# Patient Record
Sex: FEMALE | Race: WHITE | NOT HISPANIC OR LATINO | Employment: UNEMPLOYED | RURAL
[De-identification: names, ages, dates, MRNs, and addresses within clinical notes are randomized per-mention and may not be internally consistent; named-entity substitution may affect disease eponyms.]

---

## 2017-06-27 ENCOUNTER — HISTORICAL (OUTPATIENT)
Dept: ADMINISTRATIVE | Facility: HOSPITAL | Age: 47
End: 2017-06-27

## 2017-06-28 LAB
LAB AP CLINICAL INFORMATION: NORMAL
LAB AP COMMENTS: NORMAL
LAB AP DIAGNOSIS - HISTORICAL: NORMAL
LAB AP GROSS PATHOLOGY - HISTORICAL: NORMAL
LAB AP SPECIMEN SUBMITTED - HISTORICAL: NORMAL

## 2021-01-09 PROBLEM — E89.0 HYPOTHYROIDISM, POSTSURGICAL: Status: ACTIVE | Noted: 2018-07-03

## 2021-01-09 PROBLEM — C73 PAPILLARY MICROCARCINOMA OF THYROID: Status: ACTIVE | Noted: 2017-02-01

## 2021-01-09 PROBLEM — E78.5 DYSLIPIDEMIA (HIGH LDL; LOW HDL): Status: ACTIVE | Noted: 2018-06-27

## 2021-01-09 PROBLEM — R25.2 MUSCLE CRAMPS: Status: ACTIVE | Noted: 2018-07-05

## 2021-01-09 PROBLEM — G25.81 RESTLESS LEGS SYNDROME: Status: ACTIVE | Noted: 2020-02-17

## 2021-01-09 PROBLEM — L40.50 PSORIATIC ARTHRITIS: Status: ACTIVE | Noted: 2017-10-02

## 2021-01-09 PROBLEM — Z98.890 S/P COIL EMBOLIZATION OF CEREBRAL ANEURYSM: Status: ACTIVE | Noted: 2021-01-09

## 2021-03-03 ENCOUNTER — HISTORICAL (OUTPATIENT)
Dept: ADMINISTRATIVE | Facility: HOSPITAL | Age: 51
End: 2021-03-03

## 2021-03-04 LAB

## 2021-03-29 ENCOUNTER — OFFICE VISIT (OUTPATIENT)
Dept: OTOLARYNGOLOGY | Facility: CLINIC | Age: 51
End: 2021-03-29
Payer: MEDICARE

## 2021-03-29 VITALS — WEIGHT: 162 LBS | HEIGHT: 60 IN | BODY MASS INDEX: 31.8 KG/M2

## 2021-03-29 DIAGNOSIS — K11.20 SIALOADENITIS: Primary | ICD-10-CM

## 2021-03-29 PROCEDURE — 99204 PR OFFICE/OUTPT VISIT, NEW, LEVL IV, 45-59 MIN: ICD-10-PCS | Mod: S$PBB,,, | Performed by: OTOLARYNGOLOGY

## 2021-03-29 PROCEDURE — 99213 OFFICE O/P EST LOW 20 MIN: CPT | Mod: PBBFAC | Performed by: OTOLARYNGOLOGY

## 2021-03-29 PROCEDURE — 99999 PR PBB SHADOW E&M-EST. PATIENT-LVL III: ICD-10-PCS | Mod: PBBFAC,,, | Performed by: OTOLARYNGOLOGY

## 2021-03-29 PROCEDURE — 99204 OFFICE O/P NEW MOD 45 MIN: CPT | Mod: S$PBB,,, | Performed by: OTOLARYNGOLOGY

## 2021-03-29 PROCEDURE — 99999 PR PBB SHADOW E&M-EST. PATIENT-LVL III: CPT | Mod: PBBFAC,,, | Performed by: OTOLARYNGOLOGY

## 2021-03-29 RX ORDER — CIPROFLOXACIN 500 MG/1
500 TABLET ORAL DAILY
Qty: 28 TABLET | Refills: 0 | Status: SHIPPED | OUTPATIENT
Start: 2021-03-29 | End: 2022-01-10

## 2021-04-06 ENCOUNTER — HOSPITAL ENCOUNTER (OUTPATIENT)
Dept: RADIOLOGY | Facility: HOSPITAL | Age: 51
Discharge: HOME | End: 2021-04-06
Payer: MEDICARE

## 2021-04-06 VITALS — WEIGHT: 162 LBS | BODY MASS INDEX: 31.8 KG/M2 | HEIGHT: 60 IN

## 2021-04-06 DIAGNOSIS — Z12.31 VISIT FOR SCREENING MAMMOGRAM: ICD-10-CM

## 2021-04-06 PROCEDURE — 77067 SCR MAMMO BI INCL CAD: CPT | Mod: 26,,, | Performed by: RADIOLOGY

## 2021-04-06 PROCEDURE — 77067 SCR MAMMO BI INCL CAD: CPT | Mod: TC

## 2021-04-06 PROCEDURE — 77067 MAMMO DIGITAL SCREENING BILAT: ICD-10-PCS | Mod: 26,,, | Performed by: RADIOLOGY

## 2021-04-12 ENCOUNTER — OFFICE VISIT (OUTPATIENT)
Dept: OTOLARYNGOLOGY | Facility: CLINIC | Age: 51
End: 2021-04-12
Payer: MEDICARE

## 2021-04-12 VITALS — WEIGHT: 16 LBS | BODY MASS INDEX: 3.14 KG/M2 | HEIGHT: 60 IN

## 2021-04-12 DIAGNOSIS — K11.20 SIALOADENITIS: Primary | ICD-10-CM

## 2021-04-12 PROCEDURE — 99214 OFFICE O/P EST MOD 30 MIN: CPT | Mod: S$PBB,,, | Performed by: OTOLARYNGOLOGY

## 2021-04-12 PROCEDURE — 99214 PR OFFICE/OUTPT VISIT, EST, LEVL IV, 30-39 MIN: ICD-10-PCS | Mod: S$PBB,,, | Performed by: OTOLARYNGOLOGY

## 2021-04-12 PROCEDURE — 99999 PR PBB SHADOW E&M-EST. PATIENT-LVL III: ICD-10-PCS | Mod: PBBFAC,,, | Performed by: OTOLARYNGOLOGY

## 2021-04-12 PROCEDURE — 99213 OFFICE O/P EST LOW 20 MIN: CPT | Mod: PBBFAC | Performed by: OTOLARYNGOLOGY

## 2021-04-12 PROCEDURE — 99999 PR PBB SHADOW E&M-EST. PATIENT-LVL III: CPT | Mod: PBBFAC,,, | Performed by: OTOLARYNGOLOGY

## 2021-04-18 PROBLEM — Z79.899 HIGH RISK MEDICATION USE: Status: ACTIVE | Noted: 2021-03-19

## 2021-05-03 ENCOUNTER — OFFICE VISIT (OUTPATIENT)
Dept: OTOLARYNGOLOGY | Facility: CLINIC | Age: 51
End: 2021-05-03
Payer: MEDICARE

## 2021-05-03 VITALS — WEIGHT: 165 LBS | HEIGHT: 60 IN | BODY MASS INDEX: 32.39 KG/M2

## 2021-05-03 DIAGNOSIS — K11.20 SIALADENITIS: Primary | ICD-10-CM

## 2021-05-03 PROCEDURE — 99213 OFFICE O/P EST LOW 20 MIN: CPT | Mod: PBBFAC,25,, | Performed by: OTOLARYNGOLOGY

## 2021-05-03 PROCEDURE — 42330 REMOVAL OF SALIVARY STONE: CPT | Mod: S$PBB,,, | Performed by: OTOLARYNGOLOGY

## 2021-05-03 PROCEDURE — 42330 REMOVAL OF SALIVARY STONE: CPT | Mod: PBBFAC | Performed by: OTOLARYNGOLOGY

## 2021-05-03 PROCEDURE — 99213 OFFICE O/P EST LOW 20 MIN: CPT | Mod: S$PBB,25,, | Performed by: OTOLARYNGOLOGY

## 2021-05-03 PROCEDURE — 99213 HC OFFICE/OUTPT VISIT, EST, LEVL III, 20-29 MIN: ICD-10-PCS | Mod: PBBFAC,25,, | Performed by: OTOLARYNGOLOGY

## 2021-05-03 PROCEDURE — 99213 OFFICE O/P EST LOW 20 MIN: CPT | Mod: PBBFAC,25 | Performed by: OTOLARYNGOLOGY

## 2021-05-03 PROCEDURE — 42330 PR REMOVAL SALIVARY STONE,UNCOMPLIC: ICD-10-PCS | Mod: S$PBB,,, | Performed by: OTOLARYNGOLOGY

## 2022-06-08 ENCOUNTER — HOSPITAL ENCOUNTER (OUTPATIENT)
Dept: RADIOLOGY | Facility: HOSPITAL | Age: 52
Discharge: HOME | End: 2022-06-08
Payer: MEDICARE

## 2022-06-08 DIAGNOSIS — Z12.31 VISIT FOR SCREENING MAMMOGRAM: ICD-10-CM

## 2022-06-08 PROCEDURE — 77067 SCR MAMMO BI INCL CAD: CPT | Mod: TC

## 2022-06-08 PROCEDURE — 77067 SCR MAMMO BI INCL CAD: CPT | Mod: 26,,, | Performed by: RADIOLOGY

## 2022-06-08 PROCEDURE — 77067 MAMMO DIGITAL SCREENING BILAT: ICD-10-PCS | Mod: 26,,, | Performed by: RADIOLOGY

## 2023-01-03 PROBLEM — K75.81 NASH (NONALCOHOLIC STEATOHEPATITIS): Status: ACTIVE | Noted: 2018-07-05

## 2023-05-12 ENCOUNTER — OFFICE VISIT (OUTPATIENT)
Dept: GASTROENTEROLOGY | Facility: CLINIC | Age: 53
End: 2023-05-12
Payer: COMMERCIAL

## 2023-05-12 VITALS
BODY MASS INDEX: 34 KG/M2 | HEIGHT: 60 IN | SYSTOLIC BLOOD PRESSURE: 137 MMHG | DIASTOLIC BLOOD PRESSURE: 77 MMHG | WEIGHT: 173.19 LBS

## 2023-05-12 DIAGNOSIS — K75.81 NASH (NONALCOHOLIC STEATOHEPATITIS): Primary | ICD-10-CM

## 2023-05-12 DIAGNOSIS — Z87.19 HX OF DIVERTICULITIS OF COLON: ICD-10-CM

## 2023-05-12 DIAGNOSIS — K57.92 DIVERTICULITIS: ICD-10-CM

## 2023-05-12 DIAGNOSIS — R10.32 LEFT LOWER QUADRANT PAIN: ICD-10-CM

## 2023-05-12 PROCEDURE — 99214 PR OFFICE/OUTPT VISIT, EST, LEVL IV, 30-39 MIN: ICD-10-PCS | Mod: S$PBB,,, | Performed by: NURSE PRACTITIONER

## 2023-05-12 PROCEDURE — 99214 OFFICE O/P EST MOD 30 MIN: CPT | Mod: S$PBB,,, | Performed by: NURSE PRACTITIONER

## 2023-05-12 PROCEDURE — 99213 OFFICE O/P EST LOW 20 MIN: CPT | Mod: PBBFAC | Performed by: NURSE PRACTITIONER

## 2023-05-12 RX ORDER — AMOXICILLIN AND CLAVULANATE POTASSIUM 500; 125 MG/1; MG/1
1 TABLET, FILM COATED ORAL 2 TIMES DAILY
Qty: 20 TABLET | Refills: 0 | Status: SHIPPED | OUTPATIENT
Start: 2023-05-12 | End: 2023-05-22

## 2023-05-12 NOTE — PROGRESS NOTES
Andressa Handley is a 53 y.o. female here for No chief complaint on file.        PCP: Kristian Michael II  Referring Provider: Suellen Carrasco, Rosaliep  [address] [facility] [place] [address]     HPI:    Presents with report of left lower quadrant pain.  Patient reports that she has had diverticulitis approximately 4 times in the last 6 months.  Patient did bring a CT abdomen and pelvis report on her phone dated 04/10/2023, that was reviewed.  It did show acute diverticulitis in the sigmoid colon. Patient reports that she was treated at that time with Augmentin and Bactrim.  Patient states that she continues to have left lower quadrant abdominal pain. She is immunosuppressed and is taking Xeljanz for arthritis. No fever. States that she is unable to tolerate Cipro and Flagyl due to GI upset and nervousness.  Denies hematochezia or melena, nausea,or vomiting.States that she does have some bloating with a feeling of incomplete emptying.  Last colonoscopy 03/03/2021, tubular adenoma removed.         ROS:  Review of Systems   Constitutional:  Negative for appetite change, fatigue, fever and unexpected weight change.   HENT:  Negative for trouble swallowing.    Respiratory:  Negative for shortness of breath.    Cardiovascular:  Negative for chest pain.   Gastrointestinal:  Positive for abdominal pain and constipation. Negative for blood in stool, change in bowel habit, diarrhea, nausea, vomiting, reflux and change in bowel habit.   Musculoskeletal:  Positive for back pain. Negative for gait problem and joint swelling.   Integumentary:  Negative for pallor.   Allergic/Immunologic: Positive for immunocompromised state.   Hematological:  Does not bruise/bleed easily.   Psychiatric/Behavioral:  The patient is not nervous/anxious.         PMHX:  has a past medical history of Anemia, Arthritis, Autoimmune disease, Cancer, Cerebral aneurysm, Chest pain, Headache, High cholesterol, Kidney stones, Liver cyst,  "Muscle pain, and Neuropathy.    PSHX:  has a past surgical history that includes Tonsillectomy; Appendectomy; Cholecystectomy; Hysterectomy; Spine surgery; Cerebral aneurysm repair; Craniotomy; coiling of aneurysm; Cervical fusion; right shoulder repair; right knee repair; Thyroidectomy; internal hemorrhoid removal; oopherectomy; Bladder repair; and tvt repair.    PFHX: family history includes Dementia in her father; Lupus in her mother; Parkinsonism in her father.    PSlHX:  reports that she has quit smoking. She has never used smokeless tobacco. She reports that she does not drink alcohol and does not use drugs.        Review of patient's allergies indicates:   Allergen Reactions    Duloxetine Anaphylaxis and Other (See Comments)     Pt reports "night terrors",  "excessive sweating", fluid retention, and decreased urine output.      Modafinil Other (See Comments)     Suicidal thoughts      Fluocinolone acetonide Hives and Itching    Hydrocodone Other (See Comments)     Pt reports "locks my bowels".    Hydrocodone-acetaminophen     Morphine Nausea And Vomiting       Medication List with Changes/Refills   New Medications    AMOXICILLIN-CLAVULANATE 500-125MG (AUGMENTIN) 500-125 MG TAB    Take 1 tablet (500 mg total) by mouth 2 (two) times daily. for 10 days   Current Medications    ACETAMINOPHEN (TYLENOL EXTRA STRENGTH) 500 MG PWPK    1,000 mg 3 (three) times daily.    ALPHA LIPOIC ACID 200 MG CAP        DEXTROAMPHETAMINE (DEXTROSTAT) 10 MG TABLET    Take 10 mg by mouth 2 (two) times daily.    FOLIC ACID (FOLVITE) 1 MG TABLET    Take 1,000 mcg by mouth once daily.    LEVOTHYROXINE (SYNTHROID) 75 MCG TABLET    Take 1 tablet by mouth.    MELATONIN 10 MG CAP    Take by mouth.    METOPROLOL SUCCINATE (TOPROL-XL) 25 MG 24 HR TABLET    Take 25 mg by mouth once daily.    MULTIVITAMIN-IRON-FOLIC ACID TAB    Take 1 tablet by mouth once daily.    NALTREXONE HCL (NALTREXONE ORAL)    Take 7 mg by mouth.    NALTREXONE HCL, BULK, " 100 % POWD    Take 7 mg by mouth.    SELENIUM 200 MCG TAB    Take by mouth.    XELJANZ XR 11 MG TB24            Objective Findings:  Vital Signs:  /77   Ht 5' (1.524 m)   Wt 78.6 kg (173 lb 3.2 oz)   BMI 33.83 kg/m²  Body mass index is 33.83 kg/m².    Physical Exam:  Physical Exam  Vitals and nursing note reviewed.   Constitutional:       General: She is not in acute distress.     Appearance: Normal appearance.   HENT:      Mouth/Throat:      Mouth: Mucous membranes are moist.   Cardiovascular:      Rate and Rhythm: Normal rate.   Pulmonary:      Effort: Pulmonary effort is normal.      Breath sounds: No wheezing, rhonchi or rales.   Abdominal:      General: Bowel sounds are normal. There is no distension.      Palpations: Abdomen is soft. There is no mass.      Tenderness: There is abdominal tenderness in the left lower quadrant. There is no guarding.   Skin:     General: Skin is warm and dry.      Coloration: Skin is not jaundiced or pale.   Neurological:      Mental Status: She is alert and oriented to person, place, and time.   Psychiatric:         Mood and Affect: Mood normal.        Labs:  Lab Results   Component Value Date    WBC 6.39 05/12/2023    HGB 12.2 05/12/2023    HCT 39.2 05/12/2023    MCV 90.5 05/12/2023    RDW 13.1 05/12/2023     05/12/2023    LYMPH 17.5 (L) 05/12/2023    LYMPH 1.12 05/12/2023    MONO 10.3 (H) 05/12/2023    EOS 0.06 05/12/2023    BASO 0.05 05/12/2023     Lab Results   Component Value Date     05/12/2023    K 4.4 05/12/2023     (H) 05/12/2023    CO2 29 05/12/2023    GLU 94 05/12/2023    BUN 19 (H) 05/12/2023    CREATININE 0.79 05/12/2023    CALCIUM 8.9 05/12/2023    PROT 7.3 05/12/2023    ALBUMIN 3.9 05/12/2023    BILITOT 0.4 05/12/2023    ALKPHOS 93 05/12/2023    AST 28 05/12/2023    ALT 39 05/12/2023         Imaging: No results found.      Assessment:  Andressa Handley is a 53 y.o. female here with:  1. WILSON (nonalcoholic steatohepatitis)    2. Hx of  diverticulitis of colon    3. Left lower quadrant pain    4. Diverticulitis          Recommendations:  1.  Augmentin 500/125 twice daily x 10 days  2.   Schedule CT abdomen and pelvis  3. CBC and CMP today  4.  Go to ER for continued abdominal pain, fever, nausea, or vomiting  5.   Schedule colonoscopy in 6-8 weeks for recurrent diverticulitis    Follow up in about 4 weeks (around 6/9/2023).      Order summary:  Orders Placed This Encounter    CT Abdomen Pelvis With Contrast    Comprehensive Metabolic Panel    CBC Auto Differential    amoxicillin-clavulanate 500-125mg (AUGMENTIN) 500-125 mg Tab    Colonoscopy       Thank you for allowing me to participate in the care of Andressa Handley.      Suellen Carrasco, OBINNAC

## 2023-05-17 ENCOUNTER — HOSPITAL ENCOUNTER (OUTPATIENT)
Dept: RADIOLOGY | Facility: HOSPITAL | Age: 53
Discharge: HOME | End: 2023-05-17
Attending: NURSE PRACTITIONER
Payer: COMMERCIAL

## 2023-05-17 ENCOUNTER — PATIENT MESSAGE (OUTPATIENT)
Dept: GASTROENTEROLOGY | Facility: CLINIC | Age: 53
End: 2023-05-17
Payer: MEDICARE

## 2023-05-17 DIAGNOSIS — R10.32 LEFT LOWER QUADRANT PAIN: ICD-10-CM

## 2023-05-17 PROCEDURE — 74177 CT ABD & PELVIS W/CONTRAST: CPT | Mod: TC

## 2023-05-17 PROCEDURE — 25500020 PHARM REV CODE 255: Performed by: NURSE PRACTITIONER

## 2023-05-17 PROCEDURE — A9698 NON-RAD CONTRAST MATERIALNOC: HCPCS | Performed by: NURSE PRACTITIONER

## 2023-05-17 PROCEDURE — 74177 CT ABD & PELVIS W/CONTRAST: CPT | Mod: 26,,, | Performed by: RADIOLOGY

## 2023-05-17 PROCEDURE — 74177 CT ABDOMEN PELVIS WITH CONTRAST: ICD-10-PCS | Mod: 26,,, | Performed by: RADIOLOGY

## 2023-05-17 RX ADMIN — IOPAMIDOL 100 ML: 755 INJECTION, SOLUTION INTRAVENOUS at 10:05

## 2023-05-17 RX ADMIN — BARIUM SULFATE 450 ML: 20 SUSPENSION ORAL at 11:05

## 2023-06-08 ENCOUNTER — OFFICE VISIT (OUTPATIENT)
Dept: GASTROENTEROLOGY | Facility: CLINIC | Age: 53
End: 2023-06-08
Payer: COMMERCIAL

## 2023-06-08 VITALS
HEIGHT: 61 IN | DIASTOLIC BLOOD PRESSURE: 70 MMHG | BODY MASS INDEX: 32.63 KG/M2 | WEIGHT: 172.81 LBS | SYSTOLIC BLOOD PRESSURE: 122 MMHG

## 2023-06-08 DIAGNOSIS — K75.81 NASH (NONALCOHOLIC STEATOHEPATITIS): ICD-10-CM

## 2023-06-08 DIAGNOSIS — K57.92 DIVERTICULITIS: Primary | ICD-10-CM

## 2023-06-08 PROCEDURE — 1159F PR MEDICATION LIST DOCUMENTED IN MEDICAL RECORD: ICD-10-PCS | Mod: CPTII,,, | Performed by: NURSE PRACTITIONER

## 2023-06-08 PROCEDURE — 3008F BODY MASS INDEX DOCD: CPT | Mod: CPTII,,, | Performed by: NURSE PRACTITIONER

## 2023-06-08 PROCEDURE — 99213 OFFICE O/P EST LOW 20 MIN: CPT | Mod: PBBFAC | Performed by: NURSE PRACTITIONER

## 2023-06-08 PROCEDURE — 3074F SYST BP LT 130 MM HG: CPT | Mod: CPTII,,, | Performed by: NURSE PRACTITIONER

## 2023-06-08 PROCEDURE — 3074F PR MOST RECENT SYSTOLIC BLOOD PRESSURE < 130 MM HG: ICD-10-PCS | Mod: CPTII,,, | Performed by: NURSE PRACTITIONER

## 2023-06-08 PROCEDURE — 3008F PR BODY MASS INDEX (BMI) DOCUMENTED: ICD-10-PCS | Mod: CPTII,,, | Performed by: NURSE PRACTITIONER

## 2023-06-08 PROCEDURE — 3078F DIAST BP <80 MM HG: CPT | Mod: CPTII,,, | Performed by: NURSE PRACTITIONER

## 2023-06-08 PROCEDURE — 1159F MED LIST DOCD IN RCRD: CPT | Mod: CPTII,,, | Performed by: NURSE PRACTITIONER

## 2023-06-08 PROCEDURE — 99214 PR OFFICE/OUTPT VISIT, EST, LEVL IV, 30-39 MIN: ICD-10-PCS | Mod: S$PBB,,, | Performed by: NURSE PRACTITIONER

## 2023-06-08 PROCEDURE — 99214 OFFICE O/P EST MOD 30 MIN: CPT | Mod: S$PBB,,, | Performed by: NURSE PRACTITIONER

## 2023-06-08 PROCEDURE — 3078F PR MOST RECENT DIASTOLIC BLOOD PRESSURE < 80 MM HG: ICD-10-PCS | Mod: CPTII,,, | Performed by: NURSE PRACTITIONER

## 2023-06-08 NOTE — PROGRESS NOTES
Andressa Handley is a 53 y.o. female here for No chief complaint on file.        PCP: Kristian Michael II  Referring Provider: No referring provider defined for this encounter.     HPI:  Presents for follow-up due to diverticulitis.  Patient has had recurrent diverticulitis x 4 in the last 6 months.  CT abdomen and pelvis was performed on 05/17 that did show mild diverticulitis.  The patient was treated with Augmentin.  She reports that she has had some improvement in the abdominal pain.  States that she does have some tenderness with bowel movements.  She is scheduled for colonoscopy in July due to recurrent diverticulitis.  She is also immune suppressed and is currently taking Xeljanz.  We did discuss that for continued recurrent diverticulitis with referral to surgeon may be necessary.  No hematochezia or melena.  Denies any constipation.  Last colonoscopy was 03/03/2021, tubular adenoma removed.  No nausea or vomiting.  Afebrile.  We did discuss that for acute abdominal pain that is accompanied by fever, nausea, or vomiting.  That she may have to go to the ER for evaluation.  Otherwise she will contact our office if repeat antibiotics are needed.        ROS:  Review of Systems   Constitutional:  Positive for appetite change. Negative for fatigue, fever and unexpected weight change.   HENT:  Negative for trouble swallowing.    Respiratory:  Negative for shortness of breath.    Cardiovascular:  Negative for chest pain.   Gastrointestinal:  Positive for abdominal pain (with bowel movement). Negative for blood in stool, change in bowel habit, constipation, diarrhea, nausea, vomiting, reflux and change in bowel habit.   Musculoskeletal:  Positive for arthralgias. Negative for gait problem.   Integumentary:  Negative for pallor.   Allergic/Immunologic: Positive for immunocompromised state (on Xeljanz).   Neurological:  Negative for dizziness and light-headedness.   Hematological:  Does not bruise/bleed easily.  "  Psychiatric/Behavioral:  The patient is not nervous/anxious.         PMHX:  has a past medical history of Anemia, Arthritis, Autoimmune disease, Cancer, Cerebral aneurysm, Chest pain, Headache, High cholesterol, Kidney stones, Liver cyst, Muscle pain, and Neuropathy.    PSHX:  has a past surgical history that includes Tonsillectomy; Appendectomy; Cholecystectomy; Hysterectomy; Spine surgery; Cerebral aneurysm repair; Craniotomy; coiling of aneurysm; Cervical fusion; right shoulder repair; right knee repair; Thyroidectomy; internal hemorrhoid removal; oopherectomy; Bladder repair; and tvt repair.    PFHX: family history includes Dementia in her father; Lupus in her mother; Parkinsonism in her father.    PSlHX:  reports that she has quit smoking. She has never used smokeless tobacco. She reports that she does not drink alcohol and does not use drugs.        Review of patient's allergies indicates:   Allergen Reactions    Duloxetine Anaphylaxis and Other (See Comments)     Pt reports "night terrors",  "excessive sweating", fluid retention, and decreased urine output.      Modafinil Other (See Comments)     Suicidal thoughts      Fluocinolone acetonide Hives and Itching    Hydrocodone Other (See Comments)     Pt reports "locks my bowels".    Hydrocodone-acetaminophen     Morphine Nausea And Vomiting       Medication List with Changes/Refills   Current Medications    ACETAMINOPHEN (TYLENOL EXTRA STRENGTH) 500 MG PWPK    1,000 mg 3 (three) times daily.    ALPHA LIPOIC ACID 200 MG CAP        DEXTROAMPHETAMINE (DEXTROSTAT) 10 MG TABLET    Take 10 mg by mouth 2 (two) times daily.    FOLIC ACID (FOLVITE) 1 MG TABLET    Take 1,000 mcg by mouth once daily.    LEVOTHYROXINE (SYNTHROID) 75 MCG TABLET    Take 1 tablet by mouth.    MELATONIN 10 MG CAP    Take by mouth.    METOPROLOL SUCCINATE (TOPROL-XL) 25 MG 24 HR TABLET    Take 25 mg by mouth once daily.    MULTIVITAMIN-IRON-FOLIC ACID TAB    Take 1 tablet by mouth once daily. " "   NALTREXONE HCL (NALTREXONE ORAL)    Take 7 mg by mouth.    NALTREXONE HCL, BULK, 100 % POWD    Take 7 mg by mouth.    SELENIUM 200 MCG TAB    Take by mouth.    XELJANZ XR 11 MG TB24            Objective Findings:  Vital Signs:  /70   Ht 5' 1" (1.549 m)   Wt 78.4 kg (172 lb 12.8 oz)   BMI 32.65 kg/m²  Body mass index is 32.65 kg/m².    Physical Exam:  Physical Exam  Vitals and nursing note reviewed.   Constitutional:       General: She is not in acute distress.     Appearance: Normal appearance.   HENT:      Mouth/Throat:      Mouth: Mucous membranes are moist.   Cardiovascular:      Rate and Rhythm: Normal rate.   Pulmonary:      Effort: Pulmonary effort is normal.      Breath sounds: No wheezing, rhonchi or rales.   Abdominal:      General: Bowel sounds are normal. There is no distension.      Palpations: Abdomen is soft. There is no mass.      Tenderness: There is no abdominal tenderness. There is no guarding.      Comments: Diastasis Recti   Skin:     General: Skin is warm and dry.      Coloration: Skin is not jaundiced or pale.   Neurological:      Mental Status: She is alert and oriented to person, place, and time.   Psychiatric:         Mood and Affect: Mood normal.        Labs:  Lab Results   Component Value Date    WBC 6.39 05/12/2023    HGB 12.2 05/12/2023    HCT 39.2 05/12/2023    MCV 90.5 05/12/2023    RDW 13.1 05/12/2023     05/12/2023    LYMPH 17.5 (L) 05/12/2023    LYMPH 1.12 05/12/2023    MONO 10.3 (H) 05/12/2023    EOS 0.06 05/12/2023    BASO 0.05 05/12/2023     Lab Results   Component Value Date     05/12/2023    K 4.4 05/12/2023     (H) 05/12/2023    CO2 29 05/12/2023    GLU 94 05/12/2023    BUN 19 (H) 05/12/2023    CREATININE 0.79 05/12/2023    CALCIUM 8.9 05/12/2023    PROT 7.3 05/12/2023    ALBUMIN 3.9 05/12/2023    BILITOT 0.4 05/12/2023    ALKPHOS 93 05/12/2023    AST 28 05/12/2023    ALT 39 05/12/2023         Imaging: CT Abdomen Pelvis With Contrast    Result " Date: 5/17/2023  EXAMINATION: CT ABDOMEN PELVIS WITH CONTRAST CLINICAL HISTORY: LLQ abdominal pain;diverticulitis; Left lower quadrant pain TECHNIQUE: Axial CT imaging of the abdomen and pelvis is performed with intravenous and oral contrast.  Contrast dose is 100 cc Isovue 370. CT dose reduction technique used - Dose Rite and tube current modulation. COMPARISON: Twenty-one October 2015 FINDINGS: Cardiac and lung bases are within normal limits CT abdomen: The gallbladder is been removed.  There is diffusely decreased in density.  There is a small nonenhancing cyst unchanged from previous.  Duodenal diverticulum in the head of the pancreas similar to previous.  Otherwise the spleen, pancreas and adrenal glands are normal in size and enhancement.  No evidence of focal lesion is demonstrated in these solid organs. Nonenhancing calculus right kidney measuring 7.7 mm.  Otherwise kidneys are normal in size and enhancement.  No evidence of hydronephrosis or ureterolithiasis is seen. Multiple diverticula present in the colon most prominent in the descending and sigmoid colon.  Trace amount of stranding adjacent to the descending sigmoid colon junction.  The bowel caliber is normal and no wall thickening or adjacent inflammatory change is seen.  No evidence of free fluid or free air is present.  Appendix has been removed. CT pelvis: The pelvic bowel appears within normal limits.  Bladder shows no evidence of abnormality.  The uterus and ovaries are not identified.     Findings suggest mild diverticulitis.  No other bony abnormality. Electronically signed by: Hernandez Tirado Date:    05/17/2023 Time:    10:12        Assessment:  Andressa aHndley is a 53 y.o. female here with:  1. Diverticulitis    2. WILSON (nonalcoholic steatohepatitis)          Recommendations:  1. We did discuss that for acute abdominal pain that is accompanied by fever, nausea, or vomiting.  That she may have to go to the ER for evaluation.  Otherwise she  will contact our office if repeat antibiotics are needed.  2. Keep appointment for colonoscopy in July  3. Consider referral to surgeon for recurrent diverticulitis  4. Anticipate liver ultrasound in 6 months due to history of WILSON  5. Exercise 150 minutes per week  Weight loss of 7-10%. Weight loss should be gradual  Diet low in saturated fats and carbohydrates  Good glucose and cholesterol control      Follow up in about 3 months (around 9/8/2023).      Order summary:       Thank you for allowing me to participate in the care of Andressa Handley.      DEONTE Pena

## 2023-06-12 ENCOUNTER — PATIENT MESSAGE (OUTPATIENT)
Dept: GASTROENTEROLOGY | Facility: CLINIC | Age: 53
End: 2023-06-12
Payer: MEDICARE

## 2023-06-12 DIAGNOSIS — K57.92 DIVERTICULITIS: Primary | ICD-10-CM

## 2023-06-12 RX ORDER — AMOXICILLIN AND CLAVULANATE POTASSIUM 875; 125 MG/1; MG/1
1 TABLET, FILM COATED ORAL EVERY 12 HOURS
Qty: 20 TABLET | Refills: 0 | Status: SHIPPED | OUTPATIENT
Start: 2023-06-12 | End: 2023-06-22

## 2023-06-13 DIAGNOSIS — K57.92 DIVERTICULITIS: Primary | ICD-10-CM

## 2023-07-05 ENCOUNTER — PATIENT MESSAGE (OUTPATIENT)
Dept: GASTROENTEROLOGY | Facility: CLINIC | Age: 53
End: 2023-07-05
Payer: MEDICARE

## 2023-07-06 DIAGNOSIS — K57.92 DIVERTICULITIS: Primary | ICD-10-CM

## 2023-07-06 RX ORDER — AMOXICILLIN AND CLAVULANATE POTASSIUM 875; 125 MG/1; MG/1
1 TABLET, FILM COATED ORAL EVERY 12 HOURS
Qty: 20 TABLET | Refills: 0 | Status: SHIPPED | OUTPATIENT
Start: 2023-07-06 | End: 2023-07-16

## 2023-07-06 NOTE — TELEPHONE ENCOUNTER
Please push back patients colonoscopy for another 6 weeks and call her with new appointment. Thank you.

## 2023-08-31 ENCOUNTER — HOSPITAL ENCOUNTER (OUTPATIENT)
Dept: GASTROENTEROLOGY | Facility: HOSPITAL | Age: 53
Discharge: HOME | End: 2023-08-31
Attending: NURSE PRACTITIONER
Payer: MEDICARE

## 2023-08-31 ENCOUNTER — ANESTHESIA EVENT (OUTPATIENT)
Dept: GASTROENTEROLOGY | Facility: HOSPITAL | Age: 53
End: 2023-08-31
Payer: MEDICARE

## 2023-08-31 ENCOUNTER — ANESTHESIA (OUTPATIENT)
Dept: GASTROENTEROLOGY | Facility: HOSPITAL | Age: 53
End: 2023-08-31
Payer: MEDICARE

## 2023-08-31 VITALS
SYSTOLIC BLOOD PRESSURE: 100 MMHG | RESPIRATION RATE: 16 BRPM | HEART RATE: 77 BPM | OXYGEN SATURATION: 99 % | WEIGHT: 170 LBS | TEMPERATURE: 98 F | BODY MASS INDEX: 32.12 KG/M2 | DIASTOLIC BLOOD PRESSURE: 53 MMHG

## 2023-08-31 DIAGNOSIS — Z86.010 HISTORY OF COLON POLYPS: ICD-10-CM

## 2023-08-31 DIAGNOSIS — Z87.19 HISTORY OF DIVERTICULITIS: ICD-10-CM

## 2023-08-31 DIAGNOSIS — K57.92 DIVERTICULITIS: ICD-10-CM

## 2023-08-31 DIAGNOSIS — K57.30 DIVERTICULOSIS OF COLON: ICD-10-CM

## 2023-08-31 DIAGNOSIS — Z12.11 SCREENING FOR COLON CANCER: Primary | ICD-10-CM

## 2023-08-31 PROBLEM — Z86.0100 HISTORY OF COLON POLYPS: Status: ACTIVE | Noted: 2023-08-31

## 2023-08-31 PROCEDURE — 25000003 PHARM REV CODE 250: Performed by: NURSE ANESTHETIST, CERTIFIED REGISTERED

## 2023-08-31 PROCEDURE — D9220A PRA ANESTHESIA: ICD-10-PCS | Mod: ,,, | Performed by: NURSE ANESTHETIST, CERTIFIED REGISTERED

## 2023-08-31 PROCEDURE — D9220A PRA ANESTHESIA: Mod: ,,, | Performed by: NURSE ANESTHETIST, CERTIFIED REGISTERED

## 2023-08-31 PROCEDURE — 63600175 PHARM REV CODE 636 W HCPCS: Performed by: NURSE ANESTHETIST, CERTIFIED REGISTERED

## 2023-08-31 PROCEDURE — G0105 COLORECTAL SCRN; HI RISK IND: HCPCS | Performed by: INTERNAL MEDICINE

## 2023-08-31 PROCEDURE — 37000008 HC ANESTHESIA 1ST 15 MINUTES

## 2023-08-31 PROCEDURE — 27000284 HC CANNULA NASAL: Performed by: NURSE ANESTHETIST, CERTIFIED REGISTERED

## 2023-08-31 PROCEDURE — G0105 COLORECTAL SCRN; HI RISK IND: HCPCS | Mod: ,,, | Performed by: INTERNAL MEDICINE

## 2023-08-31 PROCEDURE — G0105 COLORECTAL SCRN; HI RISK IND: ICD-10-PCS | Mod: ,,, | Performed by: INTERNAL MEDICINE

## 2023-08-31 PROCEDURE — 27000716 HC OXISENSOR PROBE, ANY SIZE: Performed by: NURSE ANESTHETIST, CERTIFIED REGISTERED

## 2023-08-31 RX ORDER — LIDOCAINE HYDROCHLORIDE 20 MG/ML
INJECTION, SOLUTION EPIDURAL; INFILTRATION; INTRACAUDAL; PERINEURAL
Status: DISCONTINUED | OUTPATIENT
Start: 2023-08-31 | End: 2023-08-31

## 2023-08-31 RX ORDER — SODIUM CHLORIDE 0.9 % (FLUSH) 0.9 %
10 SYRINGE (ML) INJECTION
Status: CANCELLED | OUTPATIENT
Start: 2023-08-31

## 2023-08-31 RX ORDER — PROPOFOL 10 MG/ML
VIAL (ML) INTRAVENOUS
Status: DISCONTINUED | OUTPATIENT
Start: 2023-08-31 | End: 2023-08-31

## 2023-08-31 RX ORDER — PHENYLEPHRINE HYDROCHLORIDE 10 MG/ML
INJECTION INTRAVENOUS
Status: DISCONTINUED | OUTPATIENT
Start: 2023-08-31 | End: 2023-08-31

## 2023-08-31 RX ADMIN — PROPOFOL 100 MG: 10 INJECTION, EMULSION INTRAVENOUS at 09:08

## 2023-08-31 RX ADMIN — LIDOCAINE HYDROCHLORIDE 100 MG: 20 INJECTION, SOLUTION INTRAVENOUS at 09:08

## 2023-08-31 RX ADMIN — PHENYLEPHRINE HYDROCHLORIDE 100 MCG: 10 INJECTION INTRAVENOUS at 09:08

## 2023-08-31 RX ADMIN — SODIUM CHLORIDE: 9 INJECTION, SOLUTION INTRAVENOUS at 09:08

## 2023-08-31 NOTE — H&P
"[facility]  Gastroenterology  H&P    Patient Name: Andressa Handley  MRN: [id number]  Admission Date: 8/31/2023  Code Status: No Order    Attending Provider: Suellen Carrasco FNP   Primary Care Physician: Kristian Michael II, MD  Principal Problem:<principal problem not specified>    Subjective:     History of Present Illness: Pt has history of colon polyps and recurrent diverticulitis. Her last colonoscopy was 2021.    Past Medical History:   Diagnosis Date    Anemia     Arthritis     Autoimmune disease     Cancer     thyroid    Cerebral aneurysm     Chest pain     Headache     High cholesterol     Hypotension, iatrogenic     Kidney stones     Liver cyst     Muscle pain     Neuropathy        Past Surgical History:   Procedure Laterality Date    APPENDECTOMY      BLADDER REPAIR      CEREBRAL ANEURYSM REPAIR      CERVICAL FUSION      CHOLECYSTECTOMY      coiling of aneurysm      CRANIOTOMY      HYSTERECTOMY      internal hemorrhoid removal      oopherectomy      right knee repair      right shoulder repair      SPINE SURGERY      Laminectomy and Axial Fusion at L5-S1    THYROIDECTOMY      TONSILLECTOMY      tvt repair         Review of patient's allergies indicates:   Allergen Reactions    Duloxetine Anaphylaxis and Other (See Comments)     Pt reports "night terrors",  "excessive sweating", fluid retention, and decreased urine output.      Modafinil Other (See Comments)     Suicidal thoughts      Fluocinolone acetonide Hives and Itching    Hydrocodone Other (See Comments)     Pt reports "locks my bowels".    Hydrocodone-acetaminophen     Morphine Nausea And Vomiting     Family History       Problem Relation (Age of Onset)    Dementia Father    Lupus Mother    Parkinsonism Father          Tobacco Use    Smoking status: Former    Smokeless tobacco: Never   Substance and Sexual Activity    Alcohol use: Never    Drug use: Never    Sexual activity: Not on file     Review of Systems   Respiratory: Negative.   " "  Cardiovascular: Negative.    Gastrointestinal: Negative.      Objective:     Vital Signs (Most Recent):  Pulse: 84 (08/31/23 0808)  Resp: 16 (08/31/23 0808)  BP: 113/69 (08/31/23 0808)  SpO2: 98 % (08/31/23 0808) Vital Signs (24h Range):  Pulse:  [84] 84  Resp:  [16] 16  SpO2:  [98 %] 98 %  BP: (113)/(69) 113/69     Weight: 77.1 kg (170 lb) (08/31/23 0808)  Body mass index is 32.12 kg/m².    No intake or output data in the 24 hours ending 08/31/23 0923    Lines/Drains/Airways       Peripheral Intravenous Line  Duration                  Peripheral IV - Single Lumen 08/31/23 0809 22 G Left;Posterior Hand <1 day                    Physical Exam  Vitals reviewed.   Constitutional:       General: She is not in acute distress.     Appearance: Normal appearance. She is well-developed. She is not ill-appearing.   HENT:      Head: Normocephalic and atraumatic.      Nose: Nose normal.   Eyes:      Pupils: Pupils are equal, round, and reactive to light.   Cardiovascular:      Rate and Rhythm: Normal rate and regular rhythm.   Pulmonary:      Effort: Pulmonary effort is normal.      Breath sounds: Normal breath sounds. No wheezing.   Abdominal:      General: Abdomen is flat. Bowel sounds are normal. There is no distension.      Palpations: Abdomen is soft.      Tenderness: There is no abdominal tenderness. There is no guarding.   Skin:     General: Skin is warm and dry.      Coloration: Skin is not jaundiced.   Neurological:      Mental Status: She is alert.   Psychiatric:         Attention and Perception: Attention normal.         Mood and Affect: Affect normal.         Speech: Speech normal.         Behavior: Behavior is cooperative.      Comments: Pt was calm while speaking.         Significant Labs:  CBC: No results for input(s): "WBC", "HGB", "HCT", "PLT" in the last 48 hours.  CMP: No results for input(s): "GLU", "CALCIUM", "ALBUMIN", "PROT", "NA", "K", "CO2", "CL", "BUN", "CREATININE", "ALKPHOS", "ALT", "AST", " ""BILITOT" in the last 48 hours.    Significant Imaging:  Imaging results within the past 24 hours have been reviewed.    Assessment/Plan:     There are no hospital problems to display for this patient.        Imp: recurrent diverticulitis; history of colon polyps  Plan: colonoscopy    Tom Sheppard MD  Gastroenterology  [facility]  "

## 2023-08-31 NOTE — TRANSFER OF CARE
Anesthesia Transfer of Care Note    Patient: Andressa Handley    Procedure(s) Performed: * No procedures listed *    Patient location: GI    Anesthesia Type: general    Transport from OR: Transported from OR on room air with adequate spontaneous ventilation. Continuous ECG monitoring in transport. Continuous SpO2 monitoring in transport    Post pain: adequate analgesia    Post assessment: no apparent anesthetic complications    Post vital signs: stable    Level of consciousness: sedated and responds to stimulation    Nausea/Vomiting: no nausea/vomiting    Complications: none    Transfer of care protocol was followedComments: Good SV continue, NAD, VSS, RTRN      Last vitals:   Visit Vitals  BP (!) 100/53 (BP Location: Right arm, Patient Position: Lying)   Pulse 77   Temp 36.4 °C (97.5 °F) (Oral)   Resp 16   Wt 77.1 kg (170 lb)   SpO2 99%   Breastfeeding No   BMI 32.12 kg/m²

## 2023-08-31 NOTE — DISCHARGE INSTRUCTIONS
Do not drive or operate machinery today.  Procedure Date  8/31/23     Impression  Overall Impression:   Diverticulosis in the descending colon and sigmoid colon  No colon polyps were seen. No active diverticulitis was seen.     Recommendation    Repeat colonoscopy in 5 years      High fiber diet  Discharge:          disp : DC to home. Resume diet. No driving x 24 hours. F/U with PCP as scheduled.  Dx: Colon diverticulosis, history of colon polyps; history of diverticulitis.

## 2023-08-31 NOTE — ANESTHESIA PREPROCEDURE EVALUATION
08/31/2023  Andressa Handley is a 53 y.o., female.      Pre-op Assessment    I have reviewed the Patient Summary Reports.     I have reviewed the Nursing Notes. I have reviewed the NPO Status.   I have reviewed the Medications.     Review of Systems  Anesthesia Hx:  No problems with previous Anesthesia    Social:  No Alcohol Use, Non-Smoker    Cardiovascular:   Hypertension, well controlled Valvular problems/Murmurs, MR hyperlipidemia    Pulmonary:   Sleep Apnea, CPAP    Renal/:   Chronic Renal Disease, CKD    Hepatic/GI:   Liver Disease, Hepatitis Nonalcoholic steatohepatitis   Musculoskeletal:   Arthritis  Cervical Spondylosis  Lumbar spondylosis   Neurological:   Headaches Non ruptured cerebral aneursym    Endocrine:   Hypothyroidism  Obesity / BMI > 30      Physical Exam  General: Well nourished, Cooperative, Alert and Oriented    Airway:  Mallampati: II   Mouth Opening: Normal  TM Distance: Normal  Tongue: Normal  Neck ROM: Normal ROM    Dental:  Intact        Anesthesia Plan  Type of Anesthesia, risks & benefits discussed:    Anesthesia Type: Gen Natural Airway, MAC  Intra-op Monitoring Plan: Standard ASA Monitors  Post Op Pain Control Plan: multimodal analgesia and IV/PO Opioids PRN  Induction:  IV  Informed Consent: Informed consent signed with the Patient and all parties understand the risks and agree with anesthesia plan.  All questions answered. Patient consented to blood products? Yes  ASA Score: 3  Day of Surgery Review of History & Physical: I have interviewed and examined the patient. I have reviewed the patient's H&P dated: There are no significant changes.     Ready For Surgery From Anesthesia Perspective.     Past Medical History:   Diagnosis Date    Anemia     Arthritis     Autoimmune disease     Cancer     thyroid    Cerebral aneurysm     Chest pain     Headache     High  "cholesterol     Hypotension, iatrogenic     Kidney stones     Liver cyst     Muscle pain     Neuropathy        Past Surgical History:   Procedure Laterality Date    APPENDECTOMY      BLADDER REPAIR      CEREBRAL ANEURYSM REPAIR      CERVICAL FUSION      CHOLECYSTECTOMY      coiling of aneurysm      CRANIOTOMY      HYSTERECTOMY      internal hemorrhoid removal      oopherectomy      right knee repair      right shoulder repair      SPINE SURGERY      Laminectomy and Axial Fusion at L5-S1    THYROIDECTOMY      TONSILLECTOMY      tvt repair         Family History   Problem Relation Age of Onset    Lupus Mother     Parkinsonism Father     Dementia Father        Social History     Socioeconomic History    Marital status:    Tobacco Use    Smoking status: Former    Smokeless tobacco: Never   Substance and Sexual Activity    Alcohol use: Never    Drug use: Never       Current Outpatient Medications   Medication Sig Dispense Refill    acetaminophen (TYLENOL EXTRA STRENGTH) 500 mg PwPk 1,000 mg 3 (three) times daily.      alpha lipoic acid 200 mg Cap       dextroamphetamine (DEXTROSTAT) 10 MG tablet Take 10 mg by mouth 2 (two) times daily.      folic acid (FOLVITE) 1 MG tablet Take 1,000 mcg by mouth once daily.      levothyroxine (SYNTHROID) 75 MCG tablet Take 1 tablet by mouth.      melatonin 10 mg Cap Take by mouth.      metoprolol succinate (TOPROL-XL) 25 MG 24 hr tablet Take 25 mg by mouth once daily.      multivitamin-iron-folic acid Tab Take 1 tablet by mouth once daily.      naltrexone HCl (NALTREXONE ORAL) Take 7 mg by mouth.      naltrexone HCl, bulk, 100 % Powd Take 7 mg by mouth.      selenium 200 mcg Tab Take by mouth.      XELJANZ XR 11 mg Tb24        No current facility-administered medications for this encounter.       Review of patient's allergies indicates:   Allergen Reactions    Duloxetine Anaphylaxis and Other (See Comments)     Pt reports "night terrors", " " "excessive sweating", fluid retention, and decreased urine output.      Modafinil Other (See Comments)     Suicidal thoughts      Fluocinolone acetonide Hives and Itching    Hydrocodone Other (See Comments)     Pt reports "locks my bowels".    Hydrocodone-acetaminophen     Morphine Nausea And Vomiting     .  Patient Active Problem List   Diagnosis    Arthritis    Cerebral aneurysm, nonruptured    Cervical spondylosis    Lumbar spondylosis    Dyslipidemia (high LDL; low HDL)    Hypothyroidism, postsurgical    Muscle cramps    Mitral regurgitation    Obesity (BMI 30-39.9)    Obstructive sleep apnea    Primary narcolepsy without cataplexy    Papillary microcarcinoma of thyroid    Psoriatic arthritis    Restless legs syndrome    S/P coil embolization of cerebral aneurysm    High risk medication use    WILSON (nonalcoholic steatohepatitis)    Palpitations    Essential hypertension    Hx of diverticulitis of colon    Left lower quadrant pain         "

## 2023-08-31 NOTE — ANESTHESIA POSTPROCEDURE EVALUATION
Anesthesia Post Evaluation    Patient: Andressa Handley    Procedure(s) Performed: Colonoscopy    Final Anesthesia Type: general      Patient location during evaluation: GI PACU  Patient participation: Yes- Able to Participate  Level of consciousness: awake and alert  Post-procedure vital signs: reviewed and stable  Pain management: adequate  Airway patency: patent    PONV status at discharge: No PONV  Anesthetic complications: no      Cardiovascular status: blood pressure returned to baseline and hemodynamically stable  Respiratory status: spontaneous ventilation  Hydration status: euvolemic  Follow-up not needed.  Comments: Refer to nursing notes for pain/scott score upon discharge from recovery.          Vitals Value Taken Time   /48 08/31/23 1016   Temp 97.5 08/31/23 1023   Pulse 67 08/31/23 1017   Resp 11 08/31/23 1017   SpO2 98 % 08/31/23 1017   Vitals shown include unvalidated device data.      No case tracking events are documented in the log.      Pain/Scott Score: Scott Score: 10 (8/31/2023 10:03 AM)

## 2023-09-11 ENCOUNTER — PATIENT MESSAGE (OUTPATIENT)
Dept: GASTROENTEROLOGY | Facility: CLINIC | Age: 53
End: 2023-09-11
Payer: MEDICARE

## 2024-09-09 ENCOUNTER — HOSPITAL ENCOUNTER (OUTPATIENT)
Dept: RADIOLOGY | Facility: HOSPITAL | Age: 54
Discharge: HOME | End: 2024-09-09
Attending: RADIOLOGY
Payer: MEDICARE

## 2024-09-09 DIAGNOSIS — Z12.31 SCREENING MAMMOGRAM FOR BREAST CANCER: ICD-10-CM

## 2024-09-09 PROCEDURE — 77067 SCR MAMMO BI INCL CAD: CPT | Mod: 26,,, | Performed by: RADIOLOGY

## 2024-09-09 PROCEDURE — 77063 BREAST TOMOSYNTHESIS BI: CPT | Mod: 26,,, | Performed by: RADIOLOGY

## 2024-09-09 PROCEDURE — 77063 BREAST TOMOSYNTHESIS BI: CPT | Mod: TC

## 2024-09-09 PROCEDURE — 77067 SCR MAMMO BI INCL CAD: CPT | Mod: TC

## 2024-10-10 ENCOUNTER — OFFICE VISIT (OUTPATIENT)
Dept: GASTROENTEROLOGY | Facility: CLINIC | Age: 54
End: 2024-10-10
Payer: MEDICARE

## 2024-10-10 VITALS
OXYGEN SATURATION: 98 % | DIASTOLIC BLOOD PRESSURE: 75 MMHG | HEIGHT: 61 IN | WEIGHT: 177.38 LBS | HEART RATE: 82 BPM | BODY MASS INDEX: 33.49 KG/M2 | SYSTOLIC BLOOD PRESSURE: 146 MMHG

## 2024-10-10 DIAGNOSIS — K92.1 BLOOD IN STOOL: ICD-10-CM

## 2024-10-10 DIAGNOSIS — R10.30 LOWER ABDOMINAL PAIN: ICD-10-CM

## 2024-10-10 DIAGNOSIS — K76.0 FATTY LIVER: ICD-10-CM

## 2024-10-10 DIAGNOSIS — R19.7 DIARRHEA, UNSPECIFIED TYPE: Primary | ICD-10-CM

## 2024-10-10 DIAGNOSIS — R13.19 ESOPHAGEAL DYSPHAGIA: ICD-10-CM

## 2024-10-10 PROCEDURE — 99999 PR PBB SHADOW E&M-EST. PATIENT-LVL IV: CPT | Mod: PBBFAC,,, | Performed by: NURSE PRACTITIONER

## 2024-10-10 PROCEDURE — 3078F DIAST BP <80 MM HG: CPT | Mod: CPTII,,, | Performed by: NURSE PRACTITIONER

## 2024-10-10 PROCEDURE — 82653 EL-1 FECAL QUANTITATIVE: CPT | Mod: 90,,, | Performed by: CLINICAL MEDICAL LABORATORY

## 2024-10-10 PROCEDURE — 3008F BODY MASS INDEX DOCD: CPT | Mod: CPTII,,, | Performed by: NURSE PRACTITIONER

## 2024-10-10 PROCEDURE — 99214 OFFICE O/P EST MOD 30 MIN: CPT | Mod: PBBFAC | Performed by: NURSE PRACTITIONER

## 2024-10-10 PROCEDURE — 99214 OFFICE O/P EST MOD 30 MIN: CPT | Mod: S$PBB,,, | Performed by: NURSE PRACTITIONER

## 2024-10-10 PROCEDURE — 1159F MED LIST DOCD IN RCRD: CPT | Mod: CPTII,,, | Performed by: NURSE PRACTITIONER

## 2024-10-10 PROCEDURE — 3077F SYST BP >= 140 MM HG: CPT | Mod: CPTII,,, | Performed by: NURSE PRACTITIONER

## 2024-10-10 RX ORDER — PANTOPRAZOLE SODIUM 40 MG/1
40 TABLET, DELAYED RELEASE ORAL DAILY
Qty: 30 TABLET | Refills: 2 | Status: SHIPPED | OUTPATIENT
Start: 2024-10-10 | End: 2025-01-08

## 2024-10-10 NOTE — PROGRESS NOTES
Andressa Handley is a 54 y.o. female here for Rectal Bleeding        PCP: Kristian Michael II  Referring Provider: No referring provider defined for this encounter.     HPI:  Presents with report of blood in stool.  Patient states that she has had 2 episodes of blood in stool.  States that the last episode occurred last week.  Reports abdominal pain that is burning and crampy in nature.  Abdominal pain constant and is worse prior to bowel movement and improved after bowel movement. States that she did have urgency with bowel movement.  Reports feeling nauseated and sick due to the pain prior to having a bowel movement with blood.  The patient has a history of recurrent diverticulitis that resulted in a colon resection by Dr. Sharif.  Op note is not available for review.  She did have surgery on October 10, 2023 at [facility].  She is currently immunosuppressed, due to Cosentyx.  Is taking Aleve and Tylenol. Recommend that she stopped taking NSAIDs due to risk of bleeding.  Also advised that if there is a colitis present that NSAIDs will exacerbate symptoms.  She did have a colonoscopy on 08/31/2023, diverticulosis, no colon polyps.  This colonoscopy was prior to colon resection.  No further colonoscopy since surgery.  She is also taking dextroamphetamine 10 mg twice daily.  We did discuss that one of the potential side effects of this medication is ischemia in the gastrointestinal tract.  Have advised increased hydration.  Avoid any decongestants.  She is also reporting dysphagia with solid food.  She has required EGD dilation in the past.  Nothing recent.  States that dysphagia has worsened and is increasing with foods such as bread, potatoes, and egg.  Is not currently taking any PPI or H2 blockers.  Patient also has a history of WILSON.  Last imaging was 05/17/2023, CT abdomen and pelvis, diverticulitis.    Rectal Bleeding  Associated symptoms include abdominal pain. Pertinent negatives include no change in  "bowel habit, chest pain, fatigue, fever, nausea or vomiting.         ROS:  Review of Systems   Constitutional:  Negative for appetite change, fatigue, fever and unexpected weight change.   HENT:  Positive for trouble swallowing.    Cardiovascular:  Negative for chest pain.   Gastrointestinal:  Positive for abdominal pain, blood in stool, diarrhea and hematochezia. Negative for anal bleeding, change in bowel habit, constipation, nausea, vomiting and reflux.   Musculoskeletal:  Negative for gait problem.   Integumentary:  Negative for pallor.   Psychiatric/Behavioral:  The patient is not nervous/anxious.           PMHX:  has a past medical history of Anemia, Arthritis, Autoimmune disease, Cancer, Cerebral aneurysm, Chest pain, Headache, High cholesterol, Hypotension, iatrogenic, Kidney stones, Liver cyst, Muscle pain, and Neuropathy.    PSHX:  has a past surgical history that includes Tonsillectomy; Appendectomy; Cholecystectomy; Hysterectomy; Spine surgery; Cerebral aneurysm repair; Craniotomy; coiling of aneurysm; Cervical fusion; right shoulder repair; right knee repair; Thyroidectomy; internal hemorrhoid removal; oopherectomy; Bladder repair; and tvt repair.    PFHX: family history includes Dementia in her father; Lupus in her mother; Parkinsonism in her father.    PSlHX:  reports that she has quit smoking. She has never used smokeless tobacco. She reports that she does not drink alcohol and does not use drugs.        Review of patient's allergies indicates:   Allergen Reactions    Duloxetine Anaphylaxis and Other (See Comments)     Pt reports "night terrors",  "excessive sweating", fluid retention, and decreased urine output.      Modafinil Other (See Comments)     Suicidal thoughts      Fluocinolone acetonide Hives and Itching    Hydrocodone Other (See Comments)     Pt reports "locks my bowels".    Hydrocodone-acetaminophen     Morphine Nausea And Vomiting       Medication List with Changes/Refills   New " "Medications    PANTOPRAZOLE (PROTONIX) 40 MG TABLET    Take 1 tablet (40 mg total) by mouth once daily.   Current Medications    ACETAMINOPHEN (TYLENOL EXTRA STRENGTH) 500 MG PWPK    1,000 mg 3 (three) times daily.    ALPHA LIPOIC ACID 200 MG CAP        DEXTROAMPHETAMINE (DEXTROSTAT) 10 MG TABLET    Take 10 mg by mouth 2 (two) times daily.    FOLIC ACID (FOLVITE) 1 MG TABLET    Take 1,000 mcg by mouth once daily.    LEVOTHYROXINE (SYNTHROID) 75 MCG TABLET    Take 1 tablet by mouth.    MELATONIN 10 MG CAP    Take by mouth.    METOPROLOL SUCCINATE (TOPROL-XL) 25 MG 24 HR TABLET    Take 25 mg by mouth once daily.    MULTIVITAMIN-IRON-FOLIC ACID TAB    Take 1 tablet by mouth once daily.    NALTREXONE HCL (NALTREXONE ORAL)    Take 7 mg by mouth.    NALTREXONE HCL, BULK, 100 % POWD    Take 7 mg by mouth.    SELENIUM 200 MCG TAB    Take by mouth.    XELJANZ XR 11 MG TB24            Objective Findings:  Vital Signs:  BP (!) 146/75   Pulse 82   Ht 5' 1" (1.549 m)   Wt 80.5 kg (177 lb 6.4 oz)   SpO2 98%   BMI 33.52 kg/m²  Body mass index is 33.52 kg/m².    Physical Exam:  Physical Exam  Vitals and nursing note reviewed.   Constitutional:       General: She is not in acute distress.     Appearance: Normal appearance.   HENT:      Mouth/Throat:      Mouth: Mucous membranes are moist.   Cardiovascular:      Rate and Rhythm: Normal rate.   Pulmonary:      Effort: Pulmonary effort is normal.   Abdominal:      General: Bowel sounds are normal. There is no distension.      Palpations: Abdomen is soft. There is no mass.      Tenderness: There is generalized abdominal tenderness and tenderness in the left lower quadrant.   Skin:     General: Skin is warm and dry.      Coloration: Skin is not jaundiced or pale.   Neurological:      Mental Status: She is alert and oriented to person, place, and time.   Psychiatric:         Mood and Affect: Mood normal.          Labs:  Lab Results   Component Value Date    WBC 6.39 05/12/2023    HGB " 12.2 05/12/2023    HCT 39.2 05/12/2023    MCV 90.5 05/12/2023    RDW 13.1 05/12/2023     05/12/2023    LYMPH 17.5 (L) 05/12/2023    LYMPH 1.12 05/12/2023    MONO 10.3 (H) 05/12/2023    EOS 0.06 05/12/2023    BASO 0.05 05/12/2023     Lab Results   Component Value Date     05/12/2023    K 4.4 05/12/2023     (H) 05/12/2023    CO2 29 05/12/2023    GLU 94 05/12/2023    BUN 19 (H) 05/12/2023    CREATININE 0.79 05/12/2023    CALCIUM 8.9 05/12/2023    PROT 7.3 05/12/2023    ALBUMIN 3.9 05/12/2023    BILITOT 0.4 05/12/2023    ALKPHOS 93 05/12/2023    AST 28 05/12/2023    ALT 39 05/12/2023         Imaging: No results found.      Assessment:  Andressa Handley is a 54 y.o. female here with:  1. Diarrhea, unspecified type    2. Blood in stool    3. Esophageal dysphagia    4. Fatty liver          Recommendations:  1. Stool studies below  2. Schedule colonoscopy, add on week of Nov 11 per Dr. Sheppard  3. Avoid decongestants, NSAID's  4. Increase fluid intake alternating water and Pedialyte  5. Schedule EGD with dilation for dysphagia, Add on week of Nov 11 per Dr. Sheppard  6.Protonix 40 mg daily    No follow-ups on file.      Order summary:  Orders Placed This Encounter    Fecal leukocytes    Giardia antigen    Enteric Pathogen Panel    Calprotectin, Stool    Occult blood x 1, stool    Fecal fat, qualitative    pantoprazole (PROTONIX) 40 MG tablet    Colonoscopy    EGD       Thank you for allowing me to participate in the care of Andressa Handley.      DEONTE Pena

## 2024-10-14 DIAGNOSIS — R19.7 DIARRHEA, UNSPECIFIED TYPE: Primary | ICD-10-CM

## 2024-10-16 LAB — MAYO GENERIC ORDERABLE RESULT: NORMAL

## 2024-10-17 ENCOUNTER — HOSPITAL ENCOUNTER (OUTPATIENT)
Dept: RADIOLOGY | Facility: HOSPITAL | Age: 54
Discharge: HOME | End: 2024-10-17
Attending: NURSE PRACTITIONER
Payer: MEDICARE

## 2024-10-17 DIAGNOSIS — R10.30 LOWER ABDOMINAL PAIN: ICD-10-CM

## 2024-10-17 PROCEDURE — 74177 CT ABD & PELVIS W/CONTRAST: CPT | Mod: 26,,, | Performed by: RADIOLOGY

## 2024-10-17 PROCEDURE — 25500020 PHARM REV CODE 255: Performed by: NURSE PRACTITIONER

## 2024-10-17 PROCEDURE — A9698 NON-RAD CONTRAST MATERIALNOC: HCPCS | Performed by: NURSE PRACTITIONER

## 2024-10-17 PROCEDURE — 74177 CT ABD & PELVIS W/CONTRAST: CPT | Mod: TC

## 2024-10-17 RX ORDER — IOPAMIDOL 755 MG/ML
100 INJECTION, SOLUTION INTRAVASCULAR
Status: COMPLETED | OUTPATIENT
Start: 2024-10-17 | End: 2024-10-17

## 2024-10-17 RX ADMIN — IOPAMIDOL 100 ML: 755 INJECTION, SOLUTION INTRAVENOUS at 10:10

## 2024-10-17 RX ADMIN — BARIUM SULFATE 1150 ML: 20 SUSPENSION ORAL at 11:10

## 2024-11-11 ENCOUNTER — ANESTHESIA (OUTPATIENT)
Dept: GASTROENTEROLOGY | Facility: HOSPITAL | Age: 54
End: 2024-11-11
Payer: MEDICARE

## 2024-11-11 ENCOUNTER — HOSPITAL ENCOUNTER (OUTPATIENT)
Dept: GASTROENTEROLOGY | Facility: HOSPITAL | Age: 54
Discharge: HOME | End: 2024-11-11
Attending: NURSE PRACTITIONER | Admitting: INTERNAL MEDICINE
Payer: MEDICARE

## 2024-11-11 ENCOUNTER — ANESTHESIA EVENT (OUTPATIENT)
Dept: GASTROENTEROLOGY | Facility: HOSPITAL | Age: 54
End: 2024-11-11
Payer: MEDICARE

## 2024-11-11 VITALS
DIASTOLIC BLOOD PRESSURE: 67 MMHG | TEMPERATURE: 98 F | HEART RATE: 72 BPM | SYSTOLIC BLOOD PRESSURE: 134 MMHG | WEIGHT: 175 LBS | HEIGHT: 60 IN | BODY MASS INDEX: 34.36 KG/M2 | OXYGEN SATURATION: 95 % | RESPIRATION RATE: 12 BRPM

## 2024-11-11 DIAGNOSIS — K29.00 ACUTE SUPERFICIAL GASTRITIS WITHOUT HEMORRHAGE: ICD-10-CM

## 2024-11-11 DIAGNOSIS — R13.19 ESOPHAGEAL DYSPHAGIA: ICD-10-CM

## 2024-11-11 DIAGNOSIS — K44.9 HH (HIATUS HERNIA): Primary | ICD-10-CM

## 2024-11-11 DIAGNOSIS — R19.5 NONSPECIFIC ABNORMAL FINDING IN STOOL CONTENTS: ICD-10-CM

## 2024-11-11 PROCEDURE — 37000008 HC ANESTHESIA 1ST 15 MINUTES

## 2024-11-11 PROCEDURE — 88342 IMHCHEM/IMCYTCHM 1ST ANTB: CPT | Mod: 26,,, | Performed by: PATHOLOGY

## 2024-11-11 PROCEDURE — D9220A PRA ANESTHESIA: Mod: ,,, | Performed by: NURSE ANESTHETIST, CERTIFIED REGISTERED

## 2024-11-11 PROCEDURE — 86258 DGP ANTIBODY EACH IG CLASS: CPT | Mod: 90 | Performed by: INTERNAL MEDICINE

## 2024-11-11 PROCEDURE — 88305 TISSUE EXAM BY PATHOLOGIST: CPT | Mod: 26,,, | Performed by: PATHOLOGY

## 2024-11-11 PROCEDURE — 86231 EMA EACH IG CLASS: CPT | Mod: 90 | Performed by: INTERNAL MEDICINE

## 2024-11-11 PROCEDURE — 27201423 OPTIME MED/SURG SUP & DEVICES STERILE SUPPLY

## 2024-11-11 PROCEDURE — 36415 COLL VENOUS BLD VENIPUNCTURE: CPT | Performed by: INTERNAL MEDICINE

## 2024-11-11 PROCEDURE — 43450 DILATE ESOPHAGUS 1/MULT PASS: CPT | Mod: 59 | Performed by: INTERNAL MEDICINE

## 2024-11-11 PROCEDURE — 63600175 PHARM REV CODE 636 W HCPCS: Performed by: NURSE ANESTHETIST, CERTIFIED REGISTERED

## 2024-11-11 PROCEDURE — 43239 EGD BIOPSY SINGLE/MULTIPLE: CPT | Mod: ,,, | Performed by: INTERNAL MEDICINE

## 2024-11-11 PROCEDURE — 88305 TISSUE EXAM BY PATHOLOGIST: CPT | Mod: TC,91,SUR | Performed by: INTERNAL MEDICINE

## 2024-11-11 PROCEDURE — 43239 EGD BIOPSY SINGLE/MULTIPLE: CPT | Performed by: INTERNAL MEDICINE

## 2024-11-11 PROCEDURE — 86364 TISS TRNSGLTMNASE EA IG CLAS: CPT | Mod: 90 | Performed by: INTERNAL MEDICINE

## 2024-11-11 PROCEDURE — 37000009 HC ANESTHESIA EA ADD 15 MINS

## 2024-11-11 PROCEDURE — 43450 DILATE ESOPHAGUS 1/MULT PASS: CPT | Mod: 59,,, | Performed by: INTERNAL MEDICINE

## 2024-11-11 RX ORDER — SODIUM CHLORIDE 0.9 % (FLUSH) 0.9 %
10 SYRINGE (ML) INJECTION
Status: CANCELLED | OUTPATIENT
Start: 2024-11-11

## 2024-11-11 RX ORDER — PROPOFOL 10 MG/ML
VIAL (ML) INTRAVENOUS
Status: DISCONTINUED | OUTPATIENT
Start: 2024-11-11 | End: 2024-11-11

## 2024-11-11 RX ORDER — LIDOCAINE HYDROCHLORIDE 20 MG/ML
INJECTION, SOLUTION EPIDURAL; INFILTRATION; INTRACAUDAL; PERINEURAL
Status: DISCONTINUED | OUTPATIENT
Start: 2024-11-11 | End: 2024-11-11

## 2024-11-11 RX ADMIN — PROPOFOL 100 MG: 10 INJECTION, EMULSION INTRAVENOUS at 01:11

## 2024-11-11 RX ADMIN — LIDOCAINE HYDROCHLORIDE 80 MG: 20 INJECTION, SOLUTION INTRAVENOUS at 01:11

## 2024-11-11 NOTE — TRANSFER OF CARE
Anesthesia Transfer of Care Note    Patient: Andressa Handley    Procedure(s) Performed: * No procedures listed *    Patient location: PACU    Anesthesia Type: general    Transport from OR: Transported from OR on room air with adequate spontaneous ventilation    Post pain: adequate analgesia    Post assessment: no apparent anesthetic complications and tolerated procedure well    Post vital signs: stable    Level of consciousness: responds to stimulation    Nausea/Vomiting: no nausea/vomiting    Complications: none    Transfer of care protocol was followedComments: Report Given to PACU rn VSS      Last vitals: Visit Vitals  BP (!) 105/55 (BP Location: Right arm, Patient Position: Lying)   Pulse (!) 57   Temp 36.6 °C (97.8 °F) (Oral)   Resp 17   Ht 5' (1.524 m)   Wt 79.4 kg (175 lb)   SpO2 98%   Breastfeeding No   BMI 34.18 kg/m²

## 2024-11-11 NOTE — ANESTHESIA PREPROCEDURE EVALUATION
11/11/2024  Andressa Handley is a 54 y.o., female.      Pre-op Assessment    I have reviewed the Patient Summary Reports.     I have reviewed the Nursing Notes. I have reviewed the NPO Status.   I have reviewed the Medications.     Review of Systems  Anesthesia Hx:  No problems with previous Anesthesia   History of prior surgery of interest to airway management or planning:          Denies Family Hx of Anesthesia complications.    Denies Personal Hx of Anesthesia complications.                    Cardiovascular:     Hypertension                                    Hypertension         Pulmonary:        Sleep Apnea     Obstructive Sleep Apnea (ALYSSA).           Renal/:  Chronic Renal Disease        Kidney Function/Disease             Hepatic/GI:      Liver Disease, Hepatitis           Liver Disease, Hepatitis        Musculoskeletal:  Arthritis        Arthritis          Neurological:      Headaches      Dx of Headaches   Arthritis                           Endocrine:   Hypothyroidism       Hypothyroidism            Active Ambulatory Problems     Diagnosis Date Noted    Arthritis 02/20/2012    Cerebral aneurysm, nonruptured 02/20/2012    Cervical spondylosis 05/07/2014    Lumbar spondylosis 05/07/2014    Dyslipidemia (high LDL; low HDL) 06/27/2018    Hypothyroidism, postsurgical 07/03/2018    Muscle cramps 07/05/2018    Mitral regurgitation 11/29/2011    Obesity (BMI 30-39.9) 04/03/2012    Obstructive sleep apnea 11/29/2011    Primary narcolepsy without cataplexy 11/29/2011    Papillary microcarcinoma of thyroid 02/01/2017    Psoriatic arthritis 10/02/2017    Restless legs syndrome 02/17/2020    S/P coil embolization of cerebral aneurysm 01/09/2021    High risk medication use 03/19/2021    WILSON (nonalcoholic steatohepatitis) 07/05/2018    Palpitations 05/17/2016    Essential hypertension 05/17/2016    History of  "diverticulitis 05/12/2023    Left lower quadrant pain 05/12/2023    Screening for colon cancer 08/31/2023    History of colon polyps 08/31/2023    Diverticulosis of colon 08/31/2023    Esophageal dysphagia 10/10/2024    Blood in stool 10/10/2024    Diarrhea 10/10/2024    Fatty liver 10/10/2024     Resolved Ambulatory Problems     Diagnosis Date Noted    No Resolved Ambulatory Problems     Past Medical History:   Diagnosis Date    Anemia     Autoimmune disease     Cancer     Cerebral aneurysm     Chest pain     Headache     High cholesterol     Hypotension, iatrogenic     Kidney stones     Liver cyst     Muscle pain     Neuropathy       Review of patient's allergies indicates:   Allergen Reactions    Duloxetine Anaphylaxis and Other (See Comments)     Pt reports "night terrors",  "excessive sweating", fluid retention, and decreased urine output.      Modafinil Other (See Comments)     Suicidal thoughts      Fluocinolone acetonide Hives and Itching    Hydrocodone Other (See Comments)     Pt reports "locks my bowels".    Hydrocodone-acetaminophen     Morphine Nausea And Vomiting      Current Outpatient Medications on File Prior to Visit   Medication Sig Dispense Refill    acetaminophen (TYLENOL EXTRA STRENGTH) 500 mg PwPk 1,000 mg 3 (three) times daily.      alpha lipoic acid 200 mg Cap       dextroamphetamine (DEXTROSTAT) 10 MG tablet Take 10 mg by mouth 2 (two) times daily.      folic acid (FOLVITE) 1 MG tablet Take 1,000 mcg by mouth once daily.      levothyroxine (SYNTHROID) 75 MCG tablet Take 1 tablet by mouth.      melatonin 10 mg Cap Take by mouth.      metoprolol succinate (TOPROL-XL) 25 MG 24 hr tablet Take 25 mg by mouth once daily.      multivitamin-iron-folic acid Tab Take 1 tablet by mouth once daily.      naltrexone HCl (NALTREXONE ORAL) Take 7 mg by mouth.      naltrexone HCl, bulk, 100 % Powd Take 7 mg by mouth.      pantoprazole (PROTONIX) 40 MG tablet Take 1 tablet (40 mg total) by mouth once daily. " 30 tablet 2    selenium 200 mcg Tab Take by mouth.      XELJANZ XR 11 mg Tb24        No current facility-administered medications on file prior to visit.      Past Surgical History:   Procedure Laterality Date    APPENDECTOMY      BLADDER REPAIR      CEREBRAL ANEURYSM REPAIR      CERVICAL FUSION      CHOLECYSTECTOMY      coiling of aneurysm      CRANIOTOMY      HYSTERECTOMY      internal hemorrhoid removal      oopherectomy      right knee repair      right shoulder repair      SPINE SURGERY      Laminectomy and Axial Fusion at L5-S1    THYROIDECTOMY      TONSILLECTOMY      tvt repair          Physical Exam  General: Well nourished    Airway:  Mallampati: II   Mouth Opening: Normal  TM Distance: Normal, at least 6 cm  Tongue: Normal  Neck ROM: Normal ROM        Anesthesia Plan  Type of Anesthesia, risks & benefits discussed:    Anesthesia Type: MAC  Intra-op Monitoring Plan: Standard ASA Monitors  Post Op Pain Control Plan: multimodal analgesia  Induction:  IV  Informed Consent: Informed consent signed with the Patient and all parties understand the risks and agree with anesthesia plan.  All questions answered. Patient consented to blood products? No  ASA Score: 3  Day of Surgery Review of History & Physical: H&P Update referred to the surgeon/provider.I have interviewed and examined the patient. I have reviewed the patient's H&P dated: There are no significant changes.     Ready For Surgery From Anesthesia Perspective.     .

## 2024-11-11 NOTE — H&P
"[facility]  Gastroenterology  H&P    Patient Name: Andressa Handley  MRN: [id number]  Admission Date: 11/11/2024  Code Status: No Order    Attending Provider: Suellen Carrasco FNP   Primary Care Physician: Kristian Michael II, MD  Principal Problem:<principal problem not specified>    Subjective:     History of Present Illness:  This patient is a 54-year-old female with complaints of epigastric pain, nausea, blood in the stool.  She is scheduled for EGD today.    Past Medical History:   Diagnosis Date    Anemia     Arthritis     Autoimmune disease     Cancer     thyroid    Cerebral aneurysm     Chest pain     Headache     High cholesterol     Hypotension, iatrogenic     Kidney stones     Liver cyst     Muscle pain     Neuropathy        Past Surgical History:   Procedure Laterality Date    APPENDECTOMY      BLADDER REPAIR      CEREBRAL ANEURYSM REPAIR      CERVICAL FUSION      CHOLECYSTECTOMY      coiling of aneurysm      CRANIOTOMY      HYSTERECTOMY      internal hemorrhoid removal      oopherectomy      right knee repair      right shoulder repair      SPINE SURGERY      Laminectomy and Axial Fusion at L5-S1    THYROIDECTOMY      TONSILLECTOMY      tvt repair         Review of patient's allergies indicates:   Allergen Reactions    Duloxetine Anaphylaxis and Other (See Comments)     Pt reports "night terrors",  "excessive sweating", fluid retention, and decreased urine output.      Modafinil Other (See Comments)     Suicidal thoughts      Fluocinolone acetonide Hives and Itching    Hydrocodone Other (See Comments)     Pt reports "locks my bowels".    Hydrocodone-acetaminophen     Morphine Nausea And Vomiting     Family History       Problem Relation (Age of Onset)    Dementia Father    Lupus Mother    Parkinsonism Father          Tobacco Use    Smoking status: Former    Smokeless tobacco: Never   Substance and Sexual Activity    Alcohol use: Never    Drug use: Never    Sexual activity: Not on file     Review " "of Systems   Respiratory: Negative.     Cardiovascular: Negative.    Gastrointestinal:  Positive for abdominal pain and nausea.     Objective:     Vital Signs (Most Recent):  Temp: 97.8 °F (36.6 °C) (11/11/24 1257)  Pulse: 73 (11/11/24 1257)  Resp: 13 (11/11/24 1257)  BP: 139/69 (11/11/24 1257)  SpO2: 97 % (11/11/24 1257) Vital Signs (24h Range):  Temp:  [97.8 °F (36.6 °C)] 97.8 °F (36.6 °C)  Pulse:  [73] 73  Resp:  [13] 13  SpO2:  [97 %] 97 %  BP: (139)/(69) 139/69     Weight: 79.4 kg (175 lb) (11/11/24 1257)  Body mass index is 34.18 kg/m².    No intake or output data in the 24 hours ending 11/11/24 1308    Lines/Drains/Airways       Peripheral Intravenous Line  Duration                  Peripheral IV - Single Lumen 11/11/24 1257 22 G Posterior;Right Hand <1 day                    Physical Exam  Vitals reviewed.   Constitutional:       General: She is not in acute distress.     Appearance: Normal appearance. She is well-developed. She is not ill-appearing.   HENT:      Head: Normocephalic and atraumatic.      Nose: Nose normal.   Eyes:      Pupils: Pupils are equal, round, and reactive to light.   Cardiovascular:      Rate and Rhythm: Normal rate and regular rhythm.   Pulmonary:      Effort: Pulmonary effort is normal.      Breath sounds: Normal breath sounds. No wheezing.   Abdominal:      General: Abdomen is flat. Bowel sounds are normal. There is no distension.      Palpations: Abdomen is soft.      Tenderness: There is no abdominal tenderness. There is no guarding.   Skin:     General: Skin is warm and dry.      Coloration: Skin is not jaundiced.   Neurological:      Mental Status: She is alert.   Psychiatric:         Attention and Perception: Attention normal.         Mood and Affect: Affect normal.         Speech: Speech normal.         Behavior: Behavior is cooperative.      Comments: Pt was calm while speaking.         Significant Labs:  CBC: No results for input(s): "WBC", "HGB", "HCT", "PLT" in the last " "48 hours.  CMP: No results for input(s): "GLU", "CALCIUM", "ALBUMIN", "PROT", "NA", "K", "CO2", "CL", "BUN", "CREATININE", "ALKPHOS", "ALT", "AST", "BILITOT" in the last 48 hours.    Significant Imaging:  Imaging results within the past 24 hours have been reviewed.    Assessment/Plan:     There are no hospital problems to display for this patient.        Impression:  Epigastric pain nausea,, blood in stool.  Plan:  EGD today  Tom Sheppard MD  Gastroenterology  [facility]  "

## 2024-11-11 NOTE — DISCHARGE INSTRUCTIONS
Procedure Date  11/11/24     Impression  Overall Impression:   Performed forceps biopsies in the duodenum  Performed forceps biopsies in the stomach  Dilated in the esophagus  5 cm hiatal hernia  Erythematous mucosa in the fundus of the stomach and antrum; performed cold forceps biopsy  Mucosa of the esophagus was normal-appearing with the Z-line at 35 cm, overlying a 5 cm hiatal hernia.  The esophagus was dilated with a 48 [place] dilator.  The stomach had mild erythema without ulcers and biopsies were obtained.  A hemostasis clip was applied at a biopsy site in the fundus because the defect was larger than expected and it bled some.  The pyloric channel was patent.  Mucosa of the duodenal was normal-appearing and biopsies were obtained due to diarrhea and history of abnormal celiac serology.  Impression:  Esophageal dysphagia, hiatal hernia, gastritis, abnormal fecal contents, status post dilation of the esophagus.  Recommendation  Await pathology results, due: 11/13/2024      Disposition:  Discharge patient home in stable condition.  Resume PPI, repeat dilation of the esophagus p.r.n., obtain celiac labs in GI recovery as ordered, return to GI clinic in 1 month or as scheduled.  NO DRIVING, OPERATING EQUIPMENT, OR SIGNING LEGAL DOCUMENTS FOR 24 HOURS.

## 2024-11-11 NOTE — ANESTHESIA POSTPROCEDURE EVALUATION
Anesthesia Post Evaluation    Patient: Andressa Handley    Procedure(s) Performed: * No procedures listed *    Final Anesthesia Type: MAC      Patient location during evaluation: GI PACU ([facility])  Patient participation: Yes- Able to Participate  Level of consciousness: awake and alert  Post-procedure vital signs: reviewed and stable  Pain management: adequate  Airway patency: patent    PONV status at discharge: No PONV  Anesthetic complications: no      Cardiovascular status: blood pressure returned to baseline, hemodynamically stable and stable  Respiratory status: unassisted  Hydration status: euvolemic  Follow-up not needed.  Comments: Refer to nursing note for pain/scott score upon discharge from recovery.               Vitals Value Taken Time   /67 11/11/24 1400   Temp 36.6 °C (97.8 °F) 11/11/24 1334   Pulse 72 11/11/24 1402   Resp 16 11/11/24 1402   SpO2 96 % 11/11/24 1402   Vitals shown include unfiled device data.      Event Time   Out of Recovery 14:17:21         Pain/Scott Score: Scott Score: 10 (11/11/2024  1:39 PM)

## 2024-11-12 LAB
ESTROGEN SERPL-MCNC: NORMAL PG/ML
INSULIN SERPL-ACNC: NORMAL U[IU]/ML
LAB AP GROSS DESCRIPTION: NORMAL
LAB AP LABORATORY NOTES: NORMAL
T3RU NFR SERPL: NORMAL %

## 2024-11-12 NOTE — PROGRESS NOTES
EGD biopsy showed normal duodenal mucosa and gastritis without Helicobacter infection.  Recommendation:  Continue Protonix as needed and follow up in GI clinic .

## 2024-11-13 LAB
GLIADIN PEPTIDE IGA SER QL IA: 18.2 U
GLIADIN PEPTIDE IGG SER QL IA: <10 U
TTG IGA SER IA-ACNC: <1.2 U/ML

## 2024-11-14 ENCOUNTER — ANESTHESIA (OUTPATIENT)
Dept: GASTROENTEROLOGY | Facility: HOSPITAL | Age: 54
End: 2024-11-14
Payer: MEDICARE

## 2024-11-14 ENCOUNTER — HOSPITAL ENCOUNTER (OUTPATIENT)
Dept: GASTROENTEROLOGY | Facility: HOSPITAL | Age: 54
Discharge: HOME | End: 2024-11-14
Attending: NURSE PRACTITIONER | Admitting: INTERNAL MEDICINE
Payer: MEDICARE

## 2024-11-14 ENCOUNTER — ANESTHESIA EVENT (OUTPATIENT)
Dept: GASTROENTEROLOGY | Facility: HOSPITAL | Age: 54
End: 2024-11-14
Payer: MEDICARE

## 2024-11-14 VITALS
SYSTOLIC BLOOD PRESSURE: 129 MMHG | WEIGHT: 173 LBS | OXYGEN SATURATION: 96 % | HEART RATE: 80 BPM | HEIGHT: 60 IN | DIASTOLIC BLOOD PRESSURE: 71 MMHG | RESPIRATION RATE: 16 BRPM | BODY MASS INDEX: 33.96 KG/M2 | TEMPERATURE: 98 F

## 2024-11-14 DIAGNOSIS — Z90.49 HISTORY OF PARTIAL COLECTOMY: Primary | ICD-10-CM

## 2024-11-14 DIAGNOSIS — K57.30 COLON, DIVERTICULOSIS: ICD-10-CM

## 2024-11-14 DIAGNOSIS — K92.1 BLOOD IN STOOL: ICD-10-CM

## 2024-11-14 DIAGNOSIS — R19.7 DIARRHEA, UNSPECIFIED TYPE: ICD-10-CM

## 2024-11-14 DIAGNOSIS — K62.1 POLYP OF RECTUM: ICD-10-CM

## 2024-11-14 LAB — ENDOMYSIUM IGA SER QL IF: NEGATIVE

## 2024-11-14 PROCEDURE — 88305 TISSUE EXAM BY PATHOLOGIST: CPT | Mod: TC,91,SUR | Performed by: INTERNAL MEDICINE

## 2024-11-14 PROCEDURE — 37000008 HC ANESTHESIA 1ST 15 MINUTES

## 2024-11-14 PROCEDURE — 27201423 OPTIME MED/SURG SUP & DEVICES STERILE SUPPLY

## 2024-11-14 PROCEDURE — 37000009 HC ANESTHESIA EA ADD 15 MINS

## 2024-11-14 PROCEDURE — 27000716 HC OXISENSOR PROBE, ANY SIZE: Performed by: NURSE ANESTHETIST, CERTIFIED REGISTERED

## 2024-11-14 PROCEDURE — 27000284 HC CANNULA NASAL: Performed by: NURSE ANESTHETIST, CERTIFIED REGISTERED

## 2024-11-14 PROCEDURE — 63600175 PHARM REV CODE 636 W HCPCS: Performed by: NURSE ANESTHETIST, CERTIFIED REGISTERED

## 2024-11-14 RX ORDER — LIDOCAINE HYDROCHLORIDE 20 MG/ML
INJECTION, SOLUTION EPIDURAL; INFILTRATION; INTRACAUDAL; PERINEURAL
Status: DISCONTINUED | OUTPATIENT
Start: 2024-11-14 | End: 2024-11-14

## 2024-11-14 RX ORDER — SODIUM CHLORIDE 0.9 % (FLUSH) 0.9 %
10 SYRINGE (ML) INJECTION
Status: DISCONTINUED | OUTPATIENT
Start: 2024-11-14 | End: 2024-11-15 | Stop reason: HOSPADM

## 2024-11-14 RX ORDER — PROPOFOL 10 MG/ML
VIAL (ML) INTRAVENOUS
Status: DISCONTINUED | OUTPATIENT
Start: 2024-11-14 | End: 2024-11-14

## 2024-11-14 RX ADMIN — LIDOCAINE HYDROCHLORIDE 100 MG: 20 INJECTION, SOLUTION INTRAVENOUS at 02:11

## 2024-11-14 RX ADMIN — PROPOFOL 100 MG: 10 INJECTION, EMULSION INTRAVENOUS at 02:11

## 2024-11-14 NOTE — ANESTHESIA POSTPROCEDURE EVALUATION
Anesthesia Post Evaluation    Patient: Andressa Handley    Procedure(s) Performed: Colonoscopy    Final Anesthesia Type: MAC      Patient location during evaluation: GI PACU  Patient participation: Yes- Able to Participate  Level of consciousness: awake and alert  Post-procedure vital signs: reviewed and stable  Pain management: adequate  Airway patency: patent    PONV status at discharge: No PONV  Anesthetic complications: no      Cardiovascular status: blood pressure returned to baseline and hemodynamically stable  Respiratory status: spontaneous ventilation  Hydration status: euvolemic  Follow-up not needed.  Comments: Refer to nursing notes for pain/scott score upon discharge from recovery.              Vitals Value Taken Time   /65 11/14/24 1436   Temp 97.6 11/14/24 1437   Pulse 80 11/14/24 1436   Resp 14 11/14/24 1436   SpO2 98 % 11/14/24 1436   Vitals shown include unfiled device data.      No case tracking events are documented in the log.      Pain/Scott Score: Scott Score: 8 (11/14/2024  2:34 PM)

## 2024-11-14 NOTE — H&P
"[facility]  Gastroenterology  H&P    Patient Name: Andressa Handley  MRN: [id number]  Admission Date: 11/14/2024  Code Status: No Order    Attending Provider: Suellen Carrasco FNP   Primary Care Physician: Kristian Michael II, MD  Principal Problem:<principal problem not specified>    Subjective:     History of Present Illness:  This patient is a 54-year-old female who has had some recent blood in the stool.  She has history of colon polyps her last colonoscopy was 2021.    Past Medical History:   Diagnosis Date    Anemia     Arthritis     Autoimmune disease     Cancer     thyroid    Cerebral aneurysm     Chest pain     Headache     High cholesterol     Hypotension, iatrogenic     Kidney stones     Liver cyst     Muscle pain     Neuropathy        Past Surgical History:   Procedure Laterality Date    APPENDECTOMY      BLADDER REPAIR      CEREBRAL ANEURYSM REPAIR      CERVICAL FUSION      CHOLECYSTECTOMY      coiling of aneurysm      CRANIOTOMY      HYSTERECTOMY      internal hemorrhoid removal      oopherectomy      right knee repair      right shoulder repair      SPINE SURGERY      Laminectomy and Axial Fusion at L5-S1    THYROIDECTOMY      TONSILLECTOMY      tvt repair         Review of patient's allergies indicates:   Allergen Reactions    Duloxetine Anaphylaxis and Other (See Comments)     Pt reports "night terrors",  "excessive sweating", fluid retention, and decreased urine output.      Modafinil Other (See Comments)     Suicidal thoughts      Fluocinolone acetonide Hives and Itching    Hydrocodone Other (See Comments)     Pt reports "locks my bowels".    Hydrocodone-acetaminophen     Morphine Nausea And Vomiting     Family History       Problem Relation (Age of Onset)    Dementia Father    Lupus Mother    Parkinsonism Father          Tobacco Use    Smoking status: Former    Smokeless tobacco: Never   Substance and Sexual Activity    Alcohol use: Never    Drug use: Never    Sexual activity: Not on file " "    Review of Systems   Respiratory: Negative.     Cardiovascular: Negative.    Gastrointestinal:  Positive for blood in stool.     Objective:     Vital Signs (Most Recent):  Temp: 98.1 °F (36.7 °C) (11/14/24 1330)  Pulse: 85 (11/14/24 1330)  Resp: 13 (11/14/24 1330)  BP: 139/69 (11/14/24 1330)  SpO2: 97 % (11/14/24 1330) Vital Signs (24h Range):  Temp:  [98.1 °F (36.7 °C)] 98.1 °F (36.7 °C)  Pulse:  [85] 85  Resp:  [13] 13  SpO2:  [97 %] 97 %  BP: (139)/(69) 139/69     Weight: 78.5 kg (173 lb) (11/14/24 1330)  Body mass index is 33.79 kg/m².    No intake or output data in the 24 hours ending 11/14/24 1405    Lines/Drains/Airways       Peripheral Intravenous Line  Duration                  Peripheral IV - Single Lumen 11/14/24 1333 22 G Anterior;Distal;Right Upper Arm <1 day                    Physical Exam  Vitals reviewed.   Constitutional:       General: She is not in acute distress.     Appearance: Normal appearance. She is well-developed. She is obese. She is not ill-appearing.   HENT:      Head: Normocephalic and atraumatic.      Nose: Nose normal.   Eyes:      Pupils: Pupils are equal, round, and reactive to light.   Cardiovascular:      Rate and Rhythm: Normal rate and regular rhythm.   Pulmonary:      Effort: Pulmonary effort is normal.      Breath sounds: Normal breath sounds. No wheezing.   Abdominal:      General: Abdomen is flat. Bowel sounds are normal. There is no distension.      Palpations: Abdomen is soft.      Tenderness: There is no abdominal tenderness. There is no guarding.   Skin:     General: Skin is warm and dry.      Coloration: Skin is not jaundiced.   Neurological:      Mental Status: She is alert.   Psychiatric:         Attention and Perception: Attention normal.         Mood and Affect: Affect normal.         Speech: Speech normal.         Behavior: Behavior is cooperative.      Comments: Pt was calm while speaking.         Significant Labs:  CBC: No results for input(s): "WBC", "HGB", " ""HCT", "PLT" in the last 48 hours.  CMP: No results for input(s): "GLU", "CALCIUM", "ALBUMIN", "PROT", "NA", "K", "CO2", "CL", "BUN", "CREATININE", "ALKPHOS", "ALT", "AST", "BILITOT" in the last 48 hours.    Significant Imaging:  Imaging results within the past 24 hours have been reviewed.    Assessment/Plan:     There are no hospital problems to display for this patient.        Impression:  Blood per rectum, history of colon polyps.  Plan:  Colonoscopy today    Tom Sheppard MD  Gastroenterology  [facility]  "

## 2024-11-14 NOTE — ANESTHESIA PREPROCEDURE EVALUATION
11/14/2024  Andressa Handley is a 54 y.o., female.      Pre-op Assessment    I have reviewed the Patient Summary Reports.     I have reviewed the Nursing Notes. I have reviewed the NPO Status.   I have reviewed the Medications.     Review of Systems  Anesthesia Hx:  No problems with previous Anesthesia                Social:  No Alcohol Use, Non-Smoker       Cardiovascular:     Hypertension, well controlled Valvular problems/Murmurs, MR          hyperlipidemia                               Pulmonary:        Sleep Apnea, CPAP                Renal/:  Chronic Renal Disease                Hepatic/GI:    Hiatal Hernia,  Liver Disease, Hepatitis              Musculoskeletal:  Arthritis          Spine Disorders: cervical and lumbar            Neurological:    Neuromuscular Disease,  Headaches     Hx of cerebral aneursym s/p coil procedure                            Endocrine:   Hypothyroidism        Obesity / BMI > 30      Physical Exam  General: Well nourished, Cooperative, Alert and Oriented    Airway:  Mallampati: II   Mouth Opening: Normal  TM Distance: Normal  Tongue: Normal  Neck ROM: Normal ROM    Dental:  Intact        Anesthesia Plan  Type of Anesthesia, risks & benefits discussed:    Anesthesia Type: MAC  Intra-op Monitoring Plan: Standard ASA Monitors  Post Op Pain Control Plan: multimodal analgesia and IV/PO Opioids PRN  Induction:  IV  Informed Consent: Informed consent signed with the Patient and all parties understand the risks and agree with anesthesia plan.  All questions answered. Patient consented to blood products? Yes  ASA Score: 3  Day of Surgery Review of History & Physical: I have interviewed and examined the patient. I have reviewed the patient's H&P dated: There are no significant changes.     Ready For Surgery From Anesthesia Perspective.     .  Past Medical History:   Diagnosis Date     Anemia     Arthritis     Autoimmune disease     Cancer     thyroid    Cerebral aneurysm     Chest pain     Headache     High cholesterol     Hypotension, iatrogenic     Kidney stones     Liver cyst     Muscle pain     Neuropathy        Past Surgical History:   Procedure Laterality Date    APPENDECTOMY      BLADDER REPAIR      CEREBRAL ANEURYSM REPAIR      CERVICAL FUSION      CHOLECYSTECTOMY      coiling of aneurysm      CRANIOTOMY      HYSTERECTOMY      internal hemorrhoid removal      oopherectomy      right knee repair      right shoulder repair      SPINE SURGERY      Laminectomy and Axial Fusion at L5-S1    THYROIDECTOMY      TONSILLECTOMY      tvt repair         Family History   Problem Relation Name Age of Onset    Lupus Mother      Parkinsonism Father      Dementia Father         Social History     Socioeconomic History    Marital status:    Tobacco Use    Smoking status: Former    Smokeless tobacco: Never   Substance and Sexual Activity    Alcohol use: Never    Drug use: Never       Current Outpatient Medications   Medication Sig Dispense Refill    levothyroxine (SYNTHROID) 75 MCG tablet Take 1 tablet by mouth.      acetaminophen (TYLENOL EXTRA STRENGTH) 500 mg PwPk 1,000 mg 3 (three) times daily.      alpha lipoic acid 200 mg Cap       dextroamphetamine (DEXTROSTAT) 10 MG tablet Take 10 mg by mouth 2 (two) times daily.      folic acid (FOLVITE) 1 MG tablet Take 1,000 mcg by mouth once daily.      melatonin 10 mg Cap Take by mouth.      metoprolol succinate (TOPROL-XL) 25 MG 24 hr tablet Take 25 mg by mouth once daily.      multivitamin-iron-folic acid Tab Take 1 tablet by mouth once daily.      pantoprazole (PROTONIX) 40 MG tablet Take 1 tablet (40 mg total) by mouth once daily. 30 tablet 2    secukinumab (COSENTYX, 2 SYRINGES, SUBQ) Inject 300 mg into the skin every 28 days.       No current facility-administered medications for this encounter.       Review of patient's allergies indicates:  "  Allergen Reactions    Duloxetine Anaphylaxis and Other (See Comments)     Pt reports "night terrors",  "excessive sweating", fluid retention, and decreased urine output.      Modafinil Other (See Comments)     Suicidal thoughts      Fluocinolone acetonide Hives and Itching    Hydrocodone Other (See Comments)     Pt reports "locks my bowels".    Hydrocodone-acetaminophen     Morphine Nausea And Vomiting          "

## 2024-11-14 NOTE — TRANSFER OF CARE
Anesthesia Transfer of Care Note    Patient: Andressa Handley    Procedure(s) Performed: * No procedures listed *    Patient location: GI    Anesthesia Type: MAC    Transport from OR: Transported from OR on room air with adequate spontaneous ventilation. Continuous ECG monitoring in transport. Continuous SpO2 monitoring in transport    Post pain: adequate analgesia    Post assessment: no apparent anesthetic complications    Post vital signs: stable    Level of consciousness: sedated and responds to stimulation    Nausea/Vomiting: no nausea/vomiting    Complications: none    Transfer of care protocol was followedComments: Good SV continue, NAD, VSS, RTRN      Last vitals: Visit Vitals  /64 (BP Location: Left arm, Patient Position: Lying)   Pulse 87   Temp 36.4 °C (97.6 °F) (Oral)   Resp 18   Ht 5' (1.524 m)   Wt 78.5 kg (173 lb)   SpO2 98%   Breastfeeding No   BMI 33.79 kg/m²

## 2024-11-14 NOTE — DISCHARGE INSTRUCTIONS
Procedure Date  11/14/24     Impression  Overall Impression:   Performed forceps biopsies in the ascending colon  Performed forceps biopsies in the transverse colon  Polyp in the rectum was removed with hot snare; induced coagulation with argon plasma coagulation; induced coagulation with tip of snare  Diverticulosis in the descending colon and sigmoid colon  The colon was examined to the cecum.  Diverticula were present in the descending and sigmoid colon.  One polyp was removed from the rectum with hot snare polypectomy.  Biopsies were obtained from the ascending and transverse colon due to diarrhea.  The patient has history of a sigmoid colectomy.  Impression:  Colon diverticulosis, diarrhea, a rectal polyp was removed during this test.  Recommendation  Await pathology results, due: 11/18/2024   Repeat colonoscopy in 5 years      Disposition:  Discharge patient to home in stable condition.  High-fiber diet.  Follow up pathology results next week.  No driving until tomorrow.  Follow up with PCP as scheduled.  Return to GI clinic as needed.  NO DRIVING, OPERATING EQUIPMENT, OR SIGNING LEGAL DOCUMENTS FOR 24 HOURS.

## 2024-11-15 LAB
DHEA SERPL-MCNC: NORMAL
ESTROGEN SERPL-MCNC: NORMAL PG/ML
INSULIN SERPL-ACNC: NORMAL U[IU]/ML
LAB AP GROSS DESCRIPTION: NORMAL
LAB AP LABORATORY NOTES: NORMAL
T3RU NFR SERPL: NORMAL %

## 2024-11-18 ENCOUNTER — PATIENT MESSAGE (OUTPATIENT)
Dept: GASTROENTEROLOGY | Facility: CLINIC | Age: 54
End: 2024-11-18
Payer: MEDICARE

## 2024-12-11 ENCOUNTER — OFFICE VISIT (OUTPATIENT)
Dept: GASTROENTEROLOGY | Facility: CLINIC | Age: 54
End: 2024-12-11
Payer: MEDICARE

## 2024-12-11 VITALS
SYSTOLIC BLOOD PRESSURE: 139 MMHG | WEIGHT: 180 LBS | HEART RATE: 79 BPM | DIASTOLIC BLOOD PRESSURE: 69 MMHG | BODY MASS INDEX: 35.34 KG/M2 | HEIGHT: 60 IN | OXYGEN SATURATION: 98 %

## 2024-12-11 DIAGNOSIS — R14.0 ABDOMINAL BLOATING: ICD-10-CM

## 2024-12-11 DIAGNOSIS — R19.7 DIARRHEA, UNSPECIFIED TYPE: Primary | ICD-10-CM

## 2024-12-11 DIAGNOSIS — K75.81 NASH (NONALCOHOLIC STEATOHEPATITIS): ICD-10-CM

## 2024-12-11 DIAGNOSIS — M54.16 CHRONIC LUMBAR RADICULOPATHY: Primary | ICD-10-CM

## 2024-12-11 DIAGNOSIS — R68.81 EARLY SATIETY: ICD-10-CM

## 2024-12-11 DIAGNOSIS — Z90.49 HISTORY OF PARTIAL COLECTOMY: ICD-10-CM

## 2024-12-11 PROCEDURE — 99215 OFFICE O/P EST HI 40 MIN: CPT | Mod: PBBFAC | Performed by: NURSE PRACTITIONER

## 2024-12-11 PROCEDURE — 99214 OFFICE O/P EST MOD 30 MIN: CPT | Mod: S$PBB,,, | Performed by: NURSE PRACTITIONER

## 2024-12-11 PROCEDURE — 3078F DIAST BP <80 MM HG: CPT | Mod: CPTII,,, | Performed by: NURSE PRACTITIONER

## 2024-12-11 PROCEDURE — 99999 PR PBB SHADOW E&M-EST. PATIENT-LVL V: CPT | Mod: PBBFAC,,, | Performed by: NURSE PRACTITIONER

## 2024-12-11 PROCEDURE — 3008F BODY MASS INDEX DOCD: CPT | Mod: CPTII,,, | Performed by: NURSE PRACTITIONER

## 2024-12-11 PROCEDURE — 1159F MED LIST DOCD IN RCRD: CPT | Mod: CPTII,,, | Performed by: NURSE PRACTITIONER

## 2024-12-11 PROCEDURE — 1160F RVW MEDS BY RX/DR IN RCRD: CPT | Mod: CPTII,,, | Performed by: NURSE PRACTITIONER

## 2024-12-11 PROCEDURE — 3075F SYST BP GE 130 - 139MM HG: CPT | Mod: CPTII,,, | Performed by: NURSE PRACTITIONER

## 2024-12-11 RX ORDER — COLESTIPOL HYDROCHLORIDE 1 G/1
1 TABLET ORAL 2 TIMES DAILY
Qty: 180 TABLET | Refills: 1 | Status: SHIPPED | OUTPATIENT
Start: 2024-12-11 | End: 2025-06-09

## 2024-12-11 NOTE — PATIENT INSTRUCTIONS
Avoid spicy, greasy foods  Avoid caffeine, citric acid, chocolate, peppermint, and carbonated drinks  Do not lay down within 3 hours of eating  Exercise 150 minutes per week  Increase fluid to 64 ounces daily  Avoid antiinflammatory medications such as motrin, advil, aleve, ibuprofen, and BC powder    Colestid 1 gm twice daily, do not take within 2 hours of other medications

## 2024-12-11 NOTE — PROGRESS NOTES
Andressa Handley is a 54 y.o. female here for Follow-up        PCP: Kristian Michael II  Referring Provider: No referring provider defined for this encounter.     HPI:    Presents for follow-up after EGD and colonoscopy.  EGD dilation on 11/11/24, diffuse erythema, gastritis, 5 cm hiatal hernia.  Negative for H pylori. No intestinal metaplasia.  Recommend small frequent meals.  Patient continues to report abdominal bloating and early satiety.  States that she has had 1 episode of vomiting undigested food.  States that the food that she vomited was from the night before. We will proceed with gastric emptying study. I did advise her to avoid lying down within 3 hours of eating. No dysphagia.  Patient also reports continued intermittent diarrhea.  Stool studies were completed.  Abnormal fatty acid noted on stool.  Pancreatic elastase was negative.  She has had a previous cholecystectomy.  Colonoscopy on 11/14/2024, ulcerated pseudopolyp in the rectum.  Negative for CMV.  Biopsies negative, no evidence of inflammatory bowel disease.  We will do a trial of Colestid.    Follow-up  Associated symptoms include abdominal pain and nausea. Pertinent negatives include no change in bowel habit, chest pain, fatigue, fever, sore throat or vomiting.         ROS:  Review of Systems   Constitutional:  Negative for appetite change, fatigue, fever and unexpected weight change.   HENT:  Negative for sore throat and trouble swallowing.    Cardiovascular:  Negative for chest pain.   Gastrointestinal:  Positive for abdominal pain, nausea and reflux. Negative for blood in stool, change in bowel habit, constipation, diarrhea and vomiting.   Musculoskeletal:  Negative for gait problem.   Integumentary:  Negative for pallor.   Psychiatric/Behavioral:  The patient is not nervous/anxious.           PMHX:  has a past medical history of Anemia, Arthritis, Autoimmune disease, Cancer, Cerebral aneurysm, Chest pain, Headache, High cholesterol,  "Hypotension, iatrogenic, Kidney stones, Liver cyst, Muscle pain, and Neuropathy.    PSHX:  has a past surgical history that includes Tonsillectomy; Appendectomy; Cholecystectomy; Hysterectomy; Spine surgery; Cerebral aneurysm repair; Craniotomy; coiling of aneurysm; Cervical fusion; right shoulder repair; right knee repair; Thyroidectomy; internal hemorrhoid removal; oopherectomy; Bladder repair; and tvt repair.    PFHX: family history includes Dementia in her father; Lupus in her mother; Parkinsonism in her father.    PSlHX:  reports that she has quit smoking. She has never used smokeless tobacco. She reports that she does not drink alcohol and does not use drugs.        Review of patient's allergies indicates:   Allergen Reactions    Duloxetine Anaphylaxis and Other (See Comments)     Pt reports "night terrors",  "excessive sweating", fluid retention, and decreased urine output.      Modafinil Other (See Comments)     Suicidal thoughts      Dermabond [tissue glues] Rash    Fluocinolone acetonide Hives and Itching    Hydrocodone Other (See Comments)     Pt reports "locks my bowels".    Hydrocodone-acetaminophen     Morphine Nausea And Vomiting       Medication List with Changes/Refills   New Medications    COLESTIPOL (COLESTID) 1 GRAM TAB    Take 1 tablet (1 g total) by mouth 2 (two) times daily.   Current Medications    ACETAMINOPHEN (TYLENOL EXTRA STRENGTH) 500 MG PWPK    1,000 mg 3 (three) times daily.    ALPHA LIPOIC ACID 200 MG CAP        DEXTROAMPHETAMINE (DEXTROSTAT) 10 MG TABLET    Take 10 mg by mouth 2 (two) times daily.    FOLIC ACID (FOLVITE) 1 MG TABLET    Take 1,000 mcg by mouth once daily.    LEVOTHYROXINE (SYNTHROID) 75 MCG TABLET    Take 1 tablet by mouth.    MELATONIN 10 MG CAP    Take by mouth.    METOPROLOL SUCCINATE (TOPROL-XL) 25 MG 24 HR TABLET    Take 25 mg by mouth once daily.    MULTIVITAMIN-IRON-FOLIC ACID TAB    Take 1 tablet by mouth once daily.    PANTOPRAZOLE (PROTONIX) 40 MG TABLET    " Take 1 tablet (40 mg total) by mouth once daily.    SECUKINUMAB (COSENTYX, 2 SYRINGES, SUBQ)    Inject 300 mg into the skin every 28 days.        Objective Findings:  Vital Signs:  /69   Pulse 79   Ht 5' (1.524 m)   Wt 81.6 kg (180 lb)   SpO2 98%   BMI 35.15 kg/m²  Body mass index is 35.15 kg/m².    Physical Exam:  Physical Exam  Vitals and nursing note reviewed.   Constitutional:       General: She is not in acute distress.     Appearance: Normal appearance.   HENT:      Mouth/Throat:      Mouth: Mucous membranes are moist.   Cardiovascular:      Rate and Rhythm: Normal rate.   Pulmonary:      Effort: Pulmonary effort is normal.      Breath sounds: No wheezing, rhonchi or rales.   Abdominal:      General: Bowel sounds are normal. There is no distension.      Palpations: Abdomen is soft. There is no mass.      Tenderness: There is no abdominal tenderness.   Skin:     General: Skin is warm and dry.      Coloration: Skin is not jaundiced or pale.   Neurological:      Mental Status: She is alert and oriented to person, place, and time.   Psychiatric:         Mood and Affect: Mood normal.          Labs:  Lab Results   Component Value Date    WBC 8.76 10/10/2024    HGB 12.7 10/10/2024    HCT 40.5 10/10/2024    MCV 86.9 10/10/2024    RDW 13.5 10/10/2024     10/10/2024    LYMPH 18.6 (L) 10/10/2024    LYMPH 1.63 10/10/2024    MONO 11.1 (H) 10/10/2024    EOS 0.19 10/10/2024    BASO 0.08 10/10/2024     Lab Results   Component Value Date     10/10/2024    K 4.2 10/10/2024     (H) 10/10/2024    CO2 29 10/10/2024    GLU 91 10/10/2024    BUN 15 10/10/2024    CREATININE 0.87 10/10/2024    CALCIUM 9.1 10/10/2024    PROT 7.2 10/10/2024    ALBUMIN 3.7 10/10/2024    BILITOT 0.4 10/10/2024    ALKPHOS 98 10/10/2024    AST 30 10/10/2024    ALT 44 10/10/2024         Imaging: XR L-Spine 2 OR 3 Views Bending Only    Result Date: 11/27/2024  RADIOLOGIC EXAM: XR L-SPINE 2 OR 3 VIEWS BENDING ONLY DATE AND TIME OF  EXAMINATION: 11/27/2024 12:03 PM CLINICAL HISTORY: M43.16 - Spondylolisthesis, lumbar region; lumbar radiculopathy, anterolistheisis   COMPARISON: None. TECHNIQUE:XR L-SPINE 2 OR 3 VIEWS BENDING ONLY FINDINGS: Transitional lumbosacral anatomy with fusion at S1-S2. No acute complication. Grade 1 anterolisthesis of L5. No abnormal motion on flexion or extension views. Mild multilevel degenerative disc disease with disc space narrowing and osteophytosis. Moderate facet DJD.    IMPRESSION: Transitional lumbosacral anatomy with fusion at S1-S2. No acute complication. Grade 1 anterolisthesis of L5. No abnormal motion on flexion or extension views. Mild multilevel degenerative disc disease and moderate multilevel facet DJD.    RESIDENT RADIOLOGIST: ATTENDING RADIOLOGIST: Joshua Ashby M.D.    XR C-Spine Flex and Ext Only    Result Date: 11/27/2024  RADIOLOGIC EXAM: XR C-SPINE FLEX AND EXT ONLY DATE AND TIME OF EXAMINATION: 11/27/2024 12:02 PM CLINICAL HISTORY: M48.02 - Spinal stenosis, cervical region; neck pain   COMPARISON: None. TECHNIQUE:XR C-SPINE FLEX AND EXT ONLY FINDINGS: Mature ACDF at C6-7. No evidence of acute complication. Mild degenerative disc disease at C5-6 with disc space narrowing and osteophytosis. Mild multilevel facet DJD. No abnormal motion on flexion-extension views.    IMPRESSION: Mature ACDF at C6-7. No abnormal motion on flexion or extension views.    RESIDENT RADIOLOGIST: ATTENDING RADIOLOGIST: Joshua Ashby M.D.    X-Ray Chest PA And Lateral    Result Date: 11/15/2024  XR CHEST PA AND LATERAL CLINICAL INFORMATION:  Atelectasis. COMPARISON:  November 6, 2008. FINDINGS: The cardiomediastinal silhouette is within normal size limits. There is no focal consolidation, pleural effusion, or pneumothorax. No acute osseous abnormality identified within the regional skeleton. ACF hardware in the lower cervical spine. Surgical clips in the right upper abdominal quadrant. IMPRESSION: No acute  cardiopulmonary process. Finalized by Michael Little MD 11/15/2024 11:37 AM    Colonoscopy    Result Date: 11/14/2024  Table formatting from the original result was not included. Procedure Date 11/14/24 Impression Overall      Performed forceps biopsies in the ascending colon Performed forceps biopsies in the transverse colon Polyp in the rectum was removed with hot snare; induced coagulation with argon plasma coagulation; induced coagulation with tip of snare Diverticulosis in the descending colon and sigmoid colon The colon was examined to the cecum.  Diverticula were present in the descending and sigmoid colon.  One polyp was removed from the rectum with hot snare polypectomy.  Biopsies were obtained from the ascending and transverse colon due to diarrhea. The patient has history of a sigmoid colectomy. Impression:  Colon diverticulosis, diarrhea, a rectal polyp was removed during this test. Recommendation Await pathology results, due: 11/18/2024 Repeat colonoscopy in 5 years Disposition:  Discharge patient to home in stable condition.  High-fiber diet.  Follow up pathology results next week.  No driving until tomorrow.  Follow up with PCP as scheduled.  Return to GI clinic as needed. Indication Blood in stool, Diarrhea, unspecified type Providers Alvaro Brown RN Registered Nurse Tae Bell, Tom Rdz CRNA, MD Proceduralist Prasad Bloom, Patient Care  Medications Moderate sedation administered by anesthesia staff - See anesthesia record. Preprocedure A history and physical has been performed, and patient medication allergies have been reviewed. The patient's tolerance of previous anesthesia has been reviewed. The risks and benefits of the procedure and the sedation options and risks were discussed with the patient. All questions were answered and informed consent obtained. Details of the Procedure The patient underwent monitored anesthesia care, which was  administered by an anesthesia professional. The patient's heart rate, blood pressure, level of consciousness, respirations, oxygen, ECG and ETCO2 were monitored throughout the procedure. A digital rectal exam was performed. A perianal exam was performed. The scope was introduced through the anus and advanced to the cecum. Retroflexion was performed in the rectum. The quality of bowel preparation was evaluated using the [place] Bowel Preparation Scale with scores of: right colon = 2, transverse colon = 2, left colon = 2. The total BBPS score was 6. Bowel prep was adequate. The patient's estimated blood loss was minimal (<5 mL). The procedure was not difficult. The patient tolerated the procedure well. There were no apparent adverse events. Scope: Pediatric Colonoscope Scope Serial: [id number] Events Procedure Events Event Event Time Procedure Events Event Event Time ENDO SCOPE IN TIME 11/14/2024  2:17 PM ENDO CECUM REACHED 11/14/2024  2:21 PM ENDO SCOPE OUT TIME 11/14/2024  2:28 PM CECAL WITHDRAWAL TIME: 7m 28s Findings Performed multiple forceps biopsies in the ascending colon. Evaluation diarrhea Performed forceps biopsies in the transverse colon. Evaluation diarrhea One polyp in the rectum; performed hot snare removal; induced coagulation with with argon plasma coagulation; induced coagulation with with tip of snare Diverticula in the descending colon and sigmoid colon; no bleeding was identified         Assessment:  Andressa Handley is a 54 y.o. female here with:  1. Diarrhea, unspecified type    2. Early satiety    3. Abdominal bloating    4. History of partial colectomy    5. WILSON (nonalcoholic steatohepatitis)          Recommendations:  1.   Schedule gastric emptying study  2.  Continue PPI  3. Avoid spicy, greasy foods  Avoid caffeine, citric acid, chocolate, peppermint, and carbonated drinks  Do not lay down within 3 hours of eating  Exercise 150 minutes per week  Increase fluid to 64 ounces daily  Avoid  "antiinflammatory medications such as motrin, advil, aleve, ibuprofen, and BC powder  4.  Colestid 1 gram. twice daily.  Advised to avoid taking with other medications.  5. Weight loss of 7-10%. Weight loss should be gradual  Diet low in saturated fats and carbohydrates  Good glucose and cholesterol control      Diagnosis, risks, benefits, and side effects of any medications and treatment plan were discussed with the patient.  All questions were answered to the satisfaction of the patient, and patient verbalized understanding and agreement to the treatment plan.    Portions of this note may have been created with voice recognition software.  Occasional wrong word or "sound a like substitutions may have occurred due to inherent limitations of voice recognition software.  Please read the note carefully and recognize using contexts, where substitutions have occurred.      Follow up in about 3 months (around 3/11/2025).      Order summary:  Orders Placed This Encounter    NM Gastric Emptying    colestipoL (COLESTID) 1 gram Tab       Thank you for allowing me to participate in the care of Andressa Handley.      DEONTE Pena          "

## 2024-12-30 NOTE — PLAN OF CARE
[facility]   Physical Therapy Initial Evaluation      Name: Andressa Handley  Clinic Number: [id number]    Therapy Diagnosis:Lumbar Radiculopathy   Physician: Michelle Escobar FNP*    Physician Orders: PT Eval and Treat   Medical Diagnosis from Referral: Lumbar Radiculopathy   Evaluation Date: 1/2/2025  Authorization Period Expiration: 12/11/2025   Plan of Care Expiration: 2/28/2025   Visit # / Visits authorized: / [facility] Managed    FOTO: 35/100    Precautions: Multiple previous spinal and joint surgeries      Time In: 11:35 am   Time Out: 12:25 pm   Total Appointment Time (timed & untimed codes): 50 minutes    Subjective     Date of onset: 10/1/2024    History of current condition - Andressa reports: she has been having back pain for years. She underwent lumbar laminectomy in 2007 and a Fusion in 2008. She was mostly pain free for years but recently the back pain has started to increase to the point that she is now having trouble standing and walking. She has bilateral Lower Extremity radiculopathy and numbness with prolonged standing. Numbness is worse on the Left Lower Extremity.  She did see a Neurosurgeon, Dr Carrillo, at [facility]. X Rays did show Grade 1 anterolisthesis of L5. Neuro MD wants her to try therapy and injections first but there is a lot of degeneration and the MD feels like she will likely need surgical intervention in the future. MD ordered Outpatient Physical Therapy and patient is here today for the Evaluation.         Patient was seen by Dr Carrillo in Neurosurgery at [facility] on 12/5/2024. MD note states in part :   HPI:    Ms. Handley is being seen today as a consultation from Dr. Burris. She is a 54 y.o. female with a history of lumbar laminectomy in 2007, L5/S1 fusion in 2008 followed by C6/7 ACDF in 2009 by Dr. Richards. She c/o neck and lower back pain, present over one year. Neck pain radiates to b/l shoulders. Denies any UE radicular pain. R hand goes numb with using her phone  or trying to reach w/ arm. EMG w/ carpal tunnel, has not tried bracing. Denies any loss of fine motor skill. Does report imbalance. Lower back/sacral pain radiates to b/l posterior thighs. Left leg pain will wrap around to anterior thigh. LBP = LE pain. Symptoms worsened by ADLs. Reports n/t in the legs and feet. C/o urinary retention but denies any bowel or bladder incontinence. Reports improvement w/ both spine surgeries and reports current symptoms feel similar to those she was experiencing prior surgery. Reports failing conservative therapies prior to surgical intervention. EMG also showed chronic b/l L5 radiculopathies and R C6 radiculopathy.     Referral to Pain Mgmt for L4-5 SHASHA and PT.  Follow up in 3 months.  She may ultimately need lumbar decompression +/- fusion L4-5-S1.        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 No acute complication.    Grade 1 anterolisthesis of L5. No abnormal motion on flexion or extension views.    Mild multilevel degenerative disc disease and moderate multilevel facet DJD.    XR cervical spine 11/27/24  IMPRESSION:     Mature ACDF at C6-7.    No abnormal motion on flexion or extension views.    EMG 7/11/24    IR myelogram 6/26/24    CT cervical spine 6/26/24    CT lumbar spine 6/26/24    Assessment:    Prior Therapy: None recently   Social History:  lives with their spouse  Occupation: Disability   Prior Level of Function: Able to care for herself and her home  Current Level of Function: Pain and Lower Extremity numbness with prolonged standing and walking; difficulty with forward flexion that causes back pain and clicking in her back.    Pain:  Current 6/10, worst 10/10, best 3/10   Location: bilateral low back and Lower Extremities with pain worse on the Left; SI pain bilaterally    Description: Aching, Dull, Tingling, Numb, and Sharp  Aggravating Factors: prolonged  "standing; forward flexion (returning to upright position is worse than the actual forward flexion)  Easing Factors: relaxation, pain medication, heating pad, and rest; laying on the Right side     Patients goals: "I just want to hurt less and be able to do stuff around my house without hurting so bad."     Medical History:   Past Medical History:   Diagnosis Date    Anemia     Arthritis     Autoimmune disease     Cancer     thyroid    Cerebral aneurysm     Chest pain     Headache     High cholesterol     Hypotension, iatrogenic     Kidney stones     Liver cyst     Muscle pain     Neuropathy        Surgical History:   Andressa Handley  has a past surgical history that includes Tonsillectomy; Appendectomy; Cholecystectomy; Hysterectomy; Spine surgery; Cerebral aneurysm repair; Craniotomy; coiling of aneurysm; Cervical fusion; right shoulder repair; right knee repair; Thyroidectomy; internal hemorrhoid removal; oopherectomy; Bladder repair; and tvt repair.    Medications:   Andressa has a current medication list which includes the following prescription(s): tylenol extra strength, alpha lipoic acid, colestipol, dextroamphetamine sulfate, folic acid, levothyroxine, melatonin, metoprolol succinate, multivitamin-iron-folic acid, pantoprazole, and secukinumab.    Allergies:   Review of patient's allergies indicates:   Allergen Reactions    Duloxetine Anaphylaxis and Other (See Comments)     Pt reports "night terrors",  "excessive sweating", fluid retention, and decreased urine output.      Modafinil Other (See Comments)     Suicidal thoughts      Dermabond [tissue glues] Rash    Fluocinolone acetonide Hives and Itching    Hydrocodone Other (See Comments)     Pt reports "locks my bowels".    Hydrocodone-acetaminophen     Morphine Nausea And Vomiting        Objective        Posture :     Standing Lordosis        [x]  Increased   []   Decreased   []  Within Normal Limits  Sitting Lordosis  [x]  Increased   []   Decreased   []  " Within Normal Limits   Iliac Crest Height  []  Left/Right Increased      [x] Equal/Level  PSIS Height    []  Left/Right Increased      [x] Equal/Level  Pelvic Rot/Torsion       []   Yes     [x]   No  Scoliosis    []  Yes   Concave Right/Left      [x]  No  Lateral Shift    []   Right     []   Left          [x]  Within Normal Limits  Comments         Strength :      Myotome/Muscle :  Left   Right     L1-2    Psoas  3-/5  3/5    L3       Quadriceps  3+/5  4/5    L4       Anterior Tibialis  3/5  3+/5    L5       EHL   3+/5  3+/5    S1      Gastocnemius  3+/5  4/5    S2      Hamstrings  3/5  4/5                    Strength :   Abdominals 3-/5 (she does have diastasis recti)  Back Extensors 3/5  Multifidus 3-/5      Range of Motion :     Back :    Forward Flexion 80 degrees - pain occurs upon coming back up to a neutral position   Back Bending 10 degrees - this is the most painful motion   Side Bending Left 20 degrees and pain on left   Side Bending Right 30 degrees and pain on left   Rotation Left 30 degrees    Rotation Right 35 degrees     Hip :   Hamstrings : Tight Bilaterally with Left worse than right   Piriformis : Tight Bilaterally with Left worse than right       Special Tests :     SLR :   Right   +/- <60 Degrees     [x]   yes   []  No  ;   +/-  60-90 Degrees     [x]   Yes    []  No   Left     +/- <60 Degrees      [x]  yes    [] No ;   +/-  60-90 Degrees       [x]   Yes    [] No    Sitting Slump Test :  Left : [x] Positive   []  Negative ;  Right : [x] Positive   []  Negative   Lower Extremity Length :   [] Right/Left Longer     [x]  Within Normal Limits   KAIN : Left : [x] Positive   []  Negative ;  Right : [x] Positive   []  Negative       SI Joint :   Gaenslen's Test - Were 3 or More of the following tests positive   [x]  yes    [] No   Thigh Thrust   [x] Positive   []  Negative   Sacral Thrust   [x] Positive   []  Negative   Distraction  [] Positive   [x]  Negative   Compression   [x] Positive   []  Negative      Additional SI Tests :   Palpation   [x] Positive   []  Negative   Forward Bending [x] Positive   []  Negative         Gait Assessment : Patient is slightly flexed forward at the hips with inability to stand fully upright due to pain and core weakness      Dermatome : Numbness in bilateral Lower Extremities with prolonged standing with left worse than right      Palpation : tender to palpation along lumbar paraspinals, bilateral SI, and Bilateral Piriformis with Left more painful than the Right                           Limitation/Restriction for FOTO Intake Back Survey    Therapist reviewed FOTO scores for Andressa Handley on 1/2/2025.   FOTO documents entered into [employer] - see Media section.    Limitation Score: 65%         Patient Education      Education provided:   - Discussed the findings from the Evaluation and Reviewed the Plan of Care.          Assessment     Andressa is a 54 y.o. female referred to outpatient Physical Therapy with a medical diagnosis of Lumbar Radiculopathy. Andressa reports: she has been having back pain for years. She underwent lumbar laminectomy in 2007 and a Fusion in 2008. She was mostly pain free for years but recently the back pain has started to increase to the point that she is now having trouble standing and walking. She has bilateral Lower Extremity radiculopathy and numbness with prolonged standing. Numbness is worse on the Left Lower Extremity.  She did see a Neurosurgeon, Dr Carrillo, at [facility]. X Rays did show Grade 1 anterolisthesis of L5. Neuro MD wants her to try therapy and injections first but there is a lot of degeneration and the MD feels like she will likely need surgical intervention in the future. MD ordered Outpatient Physical Therapy and patient is here today for the Evaluation.   Patient presents with decreased lumbosacral mobility in all directions with Extension being the most limited and the most painful. She also has significant back pain following forward flexion  when she is trying to come back up to a neutral position. Patient has decreased strength in Back and Abdominals. She has the added complication of Diastasis Recti in the abdominals. Patient has decreased Lower Extremity strength with Left weaker than the Right. During standing and walking, patient is slightly flexed forward at the hips with inability to stand fully upright due to pain and core weakness. She has numbness in bilateral Lower Extremities with prolonged standing with left worse than right. Patient is tender to palpation along lumbar paraspinals, bilateral SI, and Bilateral Piriformis with Left more painful than the Right. During special tests today, patient is positive for Straight Leg Raise test, seated slump test, and SI testing.   Patient will require Physical Therapy Intervention to address all deficits and work toward completion of all goals set. Therapist will refer patient back to MD upon completion of Therapy for further assessment, injections, and possible surgery I if pain and dysfunction persist.      Patient prognosis is Guarded.   Patient will benefit from skilled outpatient Physical Therapy to address the deficits stated above and in the chart below, provide patient /family education, and to maximize patientt's level of independence.     Plan of care discussed with patient: Yes  Patient's spiritual, cultural and educational needs considered and patient is agreeable to the plan of care and goals as stated below:     Anticipated Barriers for therapy: Anterolisthesis of L5 on Si; Chronic Back Pain; Degenerative changes; need for injections; likely need for lumbar surgery.    Medical Necessity is demonstrated by the following  History  Co-morbidities and personal factors that may impact the plan of care [] LOW: no personal factors / co-morbidities  [] MODERATE: 1-2 personal factors / co-morbidities  [x] HIGH: 3+ personal factors / co-morbidities    Moderate / High Support Documentation:    Co-morbidities affecting plan of care:   Past Medical History:   Diagnosis Date    Anemia     Arthritis     Autoimmune disease     Cancer     thyroid    Cerebral aneurysm     Chest pain     Headache     High cholesterol     Hypotension, iatrogenic     Kidney stones     Liver cyst     Muscle pain     Neuropathy        has a past surgical history that includes Tonsillectomy; Appendectomy; Cholecystectomy; Hysterectomy; Spine surgery; Cerebral aneurysm repair; Craniotomy; coiling of aneurysm; Cervical fusion; right shoulder repair; right knee repair; Thyroidectomy; internal hemorrhoid removal; oopherectomy; Bladder repair; and tvt repair.  Personal Factors:   no deficits     Examination  Body Structures and Functions, activity limitations and participation restrictions that may impact the plan of care [] LOW: addressing 1-2 elements  [x] MODERATE: 3+ elements  [] HIGH: 4+ elements (please support below)    Moderate / High Support Documentation: Patient presents with decreased lumbosacral mobility in all directions with Extension being the most limited and the most painful. She also has significant back pain following forward flexion when she is trying to come back up to a neutral position. Patient has decreased strength in Back and Abdominals. She has the added complication of Diastasis Recti in the abdominals. Patient has decreased Lower Extremity strength with Left weaker than the Right. During standing and walking, patient is slightly flexed forward at the hips with inability to stand fully upright due to pain and core weakness. She has numbness in bilateral Lower Extremities with prolonged standing with left worse than right. Patient is tender to palpation along lumbar paraspinals, bilateral SI, and Bilateral Piriformis with Left more painful than the Right. During special tests today, patient is positive for Straight Leg Raise test, seated slump test, and SI testing.      Clinical Presentation [] LOW:  stable  [x] MODERATE: Evolving - Will likely require additional medical interventions at the completion of Physical Therapy if patient's pain and dysfunction continue such as injections and/or surgery  [] HIGH: Unstable     Decision Making/ Complexity Score: moderate         Goals:  Short Term Goals: 4 weeks   1. Patient will be Independent with Home Exercise Program   2. Patient will demonstrate with improved Posture and Body Mechanics  3. Patient will increase Lumbosacral Range of Motion by 25%   4. Patient will increase Lower Extremity and Core Strength to grossly 4+/5  5. Patient will decrease complaints of pain with activity to Less than or Equal to 5/10     Long Term Goals: 8 weeks   1. Patient will tolerate 30 minutes of activity with complaints of Pain at Less Than or Equal to 4/10      Plan     Plan of care Certification: 1/2/2025 to 2/28/2025.    Outpatient Physical Therapy 2 times weekly for 8 weeks to include the following interventions: 09106 [therapeutic exercise], 87211 [neuromuscular re-education], 36662 [manual therapy], 89928 [therapeutic activities], 62087 [aquatic therapy], 57177 [unattended electrical stimulation], and 63410 [mechanical traction]    TEODORO VAZQUEZ, PT, DPT

## 2025-01-02 ENCOUNTER — CLINICAL SUPPORT (OUTPATIENT)
Dept: REHABILITATION | Facility: HOSPITAL | Age: 55
End: 2025-01-02
Payer: MEDICARE

## 2025-01-02 DIAGNOSIS — M54.16 LUMBAR RADICULOPATHY: Primary | ICD-10-CM

## 2025-01-02 DIAGNOSIS — R29.898 WEAKNESS OF BOTH LOWER EXTREMITIES: ICD-10-CM

## 2025-01-02 DIAGNOSIS — R29.898 WEAKNESS OF BACK: ICD-10-CM

## 2025-01-02 DIAGNOSIS — M54.16 CHRONIC LUMBAR RADICULOPATHY: ICD-10-CM

## 2025-01-02 PROCEDURE — 97162 PT EVAL MOD COMPLEX 30 MIN: CPT

## 2025-01-02 NOTE — PROGRESS NOTES
See Plan of Care     Sup Visit performed today with JUAN C Curry, JUAN C Morales, and JUAN C Mcclain.  All goals and treatment plan reviewed. Will work toward completion of all goals set.     First Treatment May Include :         Back Exercises    Bike/NuStep                 Calf Stretch    Hamstring Stretch    Pelvic Tilts    Bridging    Bridging with Abduction    Bridging/Hooklying with Marching    Bridging/Hooklying with Knee Ext    Trunk Rotation Exercise    Trunk Rotation Stretch    Ball - Trunk Rotation    Ball- Knee Extension    Planks    Quadruped

## 2025-01-14 ENCOUNTER — CLINICAL SUPPORT (OUTPATIENT)
Dept: REHABILITATION | Facility: HOSPITAL | Age: 55
End: 2025-01-14
Payer: MEDICARE

## 2025-01-14 DIAGNOSIS — M54.16 LUMBAR RADICULOPATHY: Primary | ICD-10-CM

## 2025-01-14 DIAGNOSIS — R29.898 WEAKNESS OF BACK: ICD-10-CM

## 2025-01-14 DIAGNOSIS — R29.898 WEAKNESS OF BOTH LOWER EXTREMITIES: ICD-10-CM

## 2025-01-14 PROCEDURE — 97110 THERAPEUTIC EXERCISES: CPT | Mod: CQ

## 2025-01-14 NOTE — PROGRESS NOTES
[facility]   Physical Therapy Treatment Note      Name: Andressa Handley  Clinic Number: [id number]  Visit Date: 1/14/2025  Therapy Diagnosis:Lumbar Radiculopathy   Physician: Michelle Escobar FNP*     Physician Orders: PT Eval and Treat   Medical Diagnosis from Referral: Lumbar Radiculopathy   Evaluation Date: 1/2/2025  Authorization Period Expiration: 12/11/2025   Plan of Care Expiration: 2/28/2025   Visit # / Visits authorized: / [facility] Managed    PTA Visit #: 1/5   FOTO: 35/100     Precautions: Multiple previous spinal and joint surgeries       Time In: 11:40 am   Time Out: 12:18 pm   Total Appointment Time (timed & untimed codes): 38 minutes    Subjective     Patient reports: tightness in her legs this morning.  She  has not been given home exercise program until 1/14. Response to previous treatment: first visit since evaluation   Functional change: n/a    Pain: 2/10  Location: bilateral back      Objective      Objective Measures updated at progress report unless specified.     Treatment     Andressa received the treatments listed below:      therapeutic exercises to develop strength, endurance, ROM, flexibility, posture, and core stabilization for 38 minutes including:    Back Exercises    Bike/NuStep    Nustep x 5 minutes              Calf Stretch 4 x 15 second hold    Hamstring Stretch 4 x 15 second hold    Seated low back stretch  2 x 15 second hold    SKTC with towel 2 x 15 second hold ea(B)   DKTC with towel  2 x 15 second hold            Supine piriformis stretch  2 x 20 second hold (B)       Pelvic Tilts X 10   Bridging X 10   Bridging with Abduction X 10   Bridging/Hooklying with Marching X 10    Bridging/Hooklying with Knee Ext    Trunk Rotation Exercise    Trunk Rotation Stretch    Ball - Trunk Rotation    Ball- Knee Extension    Planks    Quadruped    Hooklying abduction  X 10 green band        manual therapy techniques: - were applied to the: - for - minutes,  including:      neuromuscular re-education activities to improve: Balance, Coordination, Kinesthetic, Sense, Proprioception, and Posture for - minutes. The following activities were included:      therapeutic activities to improve functional performance for -  minutes, including:        Patient Education and Home Exercises       Education provided:   - basic back mat and stretching home exercise program handouts given on 1/14.     Written Home Exercises Provided: Yes. Exercises were reviewed and Andressa was able to demonstrate them prior to the end of the session.  Andrsesa demonstrated good  understanding of the education provided. See Electronic Medical Record under Patient Instructions for exercises provided during therapy sessions    Assessment     Supervisory visit with TEODORO VAZQUEZ, PT, DPT prior to initial PTA visit.     Patient arrived today with reports of  tightness in her legs this morning. Today is her First treatment since evaluation with PLAN OF CARE initiated today.  Therapist initiated the Basic back mat and stretching Home Exercise Program today. Verbal and tactile cues provided for proper form for all the exercises. She was able to return demonstration well but does struggle a lot with the LLE more than the right. Patient was given a written copy of the Home Exercise Program that included pictures and written instructions. We will review these with her again at the next visit to ensure she is able to perform these Independently and correctly. Patient reports that the exercises felt good and it did not increase her lumbar pain.       Andressa is a 54 y.o. female referred to outpatient Physical Therapy with a medical diagnosis of Lumbar Radiculopathy. Andressa reports: she has been having back pain for years. She underwent lumbar laminectomy in 2007 and a Fusion in 2008. She was mostly pain free for years but recently the back pain has started to increase to the point that she is now having trouble standing  and walking. She has bilateral Lower Extremity radiculopathy and numbness with prolonged standing. Numbness is worse on the Left Lower Extremity.  She did see a Neurosurgeon, Dr Carrillo, at [facility]. X Rays did show Grade 1 anterolisthesis of L5. Neuro MD wants her to try therapy and injections first but there is a lot of degeneration and the MD feels like she will likely need surgical intervention in the future. MD ordered Outpatient Physical Therapy and patient is here today for the Evaluation.   Patient presents with decreased lumbosacral mobility in all directions with Extension being the most limited and the most painful. She also has significant back pain following forward flexion when she is trying to come back up to a neutral position. Patient has decreased strength in Back and Abdominals. She has the added complication of Diastasis Recti in the abdominals. Patient has decreased Lower Extremity strength with Left weaker than the Right. During standing and walking, patient is slightly flexed forward at the hips with inability to stand fully upright due to pain and core weakness. She has numbness in bilateral Lower Extremities with prolonged standing with left worse than right. Patient is tender to palpation along lumbar paraspinals, bilateral SI, and Bilateral Piriformis with Left more painful than the Right. During special tests today, patient is positive for Straight Leg Raise test, seated slump test, and SI testing.   Patient will require Physical Therapy Intervention to address all deficits and work toward completion of all goals set. Therapist will refer patient back to MD upon completion of Therapy for further assessment, injections, and possible surgery I if pain and dysfunction persist.       Honey Is progressing well towards her goals.   Patient prognosis is Good.     Patient will continue to benefit from skilled outpatient physical therapy to address the deficits listed in the problem list box on  initial evaluation, provide pt/family education and to maximize pt's level of independence in the home and community environment.     Patient's spiritual, cultural and educational needs considered and pt agreeable to plan of care and goals.     Anticipated Barriers for therapy: Anterolisthesis of L5 on Si; Chronic Back Pain; Degenerative changes; need for injections; likely need for lumbar surgery.  Goals:  Short Term Goals: 4 weeks   1. Patient will be Independent with Home Exercise Program   2. Patient will demonstrate with improved Posture and Body Mechanics  3. Patient will increase Lumbosacral Range of Motion by 25%   4. Patient will increase Lower Extremity and Core Strength to grossly 4+/5  5. Patient will decrease complaints of pain with activity to Less than or Equal to 5/10      Long Term Goals: 8 weeks   1. Patient will tolerate 30 minutes of activity with complaints of Pain at Less Than or Equal to 4/10    Plan     Plan of care Certification: 1/2/2025 to 2/28/2025.     Outpatient Physical Therapy 2 times weekly for 8 weeks to include the following interventions: 33715 [therapeutic exercise], 61509 [neuromuscular re-education], 82032 [manual therapy], 78891 [therapeutic activities], 43799 [aquatic therapy], 64153 [unattended electrical stimulation], and 98520 [mechanical traction]    Mikel Hill PTA

## 2025-01-15 ENCOUNTER — HOSPITAL ENCOUNTER (OUTPATIENT)
Dept: RADIOLOGY | Facility: HOSPITAL | Age: 55
Discharge: HOME | End: 2025-01-15
Attending: NURSE PRACTITIONER
Payer: MEDICARE

## 2025-01-15 DIAGNOSIS — R14.0 ABDOMINAL BLOATING: ICD-10-CM

## 2025-01-15 DIAGNOSIS — R68.81 EARLY SATIETY: ICD-10-CM

## 2025-01-15 PROCEDURE — 78264 GASTRIC EMPTYING IMG STUDY: CPT | Mod: 26,,, | Performed by: NUCLEAR MEDICINE

## 2025-01-15 PROCEDURE — A9541 TC99M SULFUR COLLOID: HCPCS | Performed by: NURSE PRACTITIONER

## 2025-01-15 PROCEDURE — 78264 GASTRIC EMPTYING IMG STUDY: CPT | Mod: TC

## 2025-01-15 RX ORDER — TECHNETIUM TC 99M SULFUR COLLOID 2 MG
540 KIT MISCELLANEOUS
Status: COMPLETED | OUTPATIENT
Start: 2025-01-15 | End: 2025-01-15

## 2025-01-15 RX ADMIN — TECHNETIUM TC 99M SULFUR COLLOID KIT 0.54 MILLICURIE: KIT at 08:01

## 2025-01-17 ENCOUNTER — CLINICAL SUPPORT (OUTPATIENT)
Dept: REHABILITATION | Facility: HOSPITAL | Age: 55
End: 2025-01-17
Payer: MEDICARE

## 2025-01-17 DIAGNOSIS — R29.898 WEAKNESS OF BACK: ICD-10-CM

## 2025-01-17 DIAGNOSIS — M54.16 LUMBAR RADICULOPATHY: Primary | ICD-10-CM

## 2025-01-17 DIAGNOSIS — R29.898 WEAKNESS OF BOTH LOWER EXTREMITIES: ICD-10-CM

## 2025-01-17 PROCEDURE — 97110 THERAPEUTIC EXERCISES: CPT | Mod: CQ

## 2025-01-17 NOTE — PROGRESS NOTES
[facility]   Physical Therapy Treatment Note      Name: Andressa Handley  Clinic Number: [id number]  Visit Date: 1/17/2025  Therapy Diagnosis:Lumbar Radiculopathy   Physician: Michelle Escobar FNP*     Physician Orders: PT Eval and Treat   Medical Diagnosis from Referral: Lumbar Radiculopathy   Evaluation Date: 1/2/2025  Authorization Period Expiration: 12/11/2025   Plan of Care Expiration: 2/28/2025   Visit # / Visits authorized: 2/ 16 - [facility]    PTA Visit #: 2/5   FOTO: 35/100     Precautions: Multiple previous spinal and joint surgeries       Time In: 09:30 am   Time Out: 10:16 am  Total Appointment Time (timed & untimed codes): 46 minutes    Subjective     Patient reports: her legs felt looser the day after her last therapy session. She is still having a lot of right hip pain.   She was compliant with home exercise program.  Response to previous treatment: less stiffness in the lower extremity   Functional change: n/a    Pain: 2/10  Location: bilateral back and right hip.    Objective      Objective Measures updated at progress report unless specified.     Treatment     Andressa received the treatments listed below:      therapeutic exercises to develop strength, endurance, ROM, flexibility, posture, and core stabilization for 46 minutes including:    Back Exercises    Bike/NuStep    Nustep x 6 minutes              Calf Stretch 4 x 15 second hold    Hamstring Stretch 4 x 15 second hold    Seated ball rollouts  5 x 5 second hold fwd/left/right            Supine piriformis stretch  2 x 20 second hold (B)       Pelvic Tilts X 15   Bridging X 10   Bridging with Abduction X 10   Bridging/Hooklying with Marching X 10    Bridging/Hooklying with Knee Ext X 10 each (B)   Trunk Rotation Exercise X 20    Trunk Rotation Stretch 2 x 15 second hold    Ball - Trunk Rotation    Ball- Knee Extension    Planks    Quadruped    Hooklying abduction  X 20 green band   Home exercise program review  X 10  minutes         manual therapy techniques: - were applied to the: - for - minutes, including:      neuromuscular re-education activities to improve: Balance, Coordination, Kinesthetic, Sense, Proprioception, and Posture for - minutes. The following activities were included:      therapeutic activities to improve functional performance for -  minutes, including:      Patient Education and Home Exercises       Education provided:   - basic back mat and stretching home exercise program handouts given on 1/14. Added Theraband rows and extension/retraction due to Patient inquiring about what exercises she could perform at home due to purchasing a set of bands at [facility].      Written Home Exercises Provided: Pt instructed to continue prior HEP. Exercises were reviewed and Andressa was able to demonstrate them prior to the end of the session.  Andressa demonstrated good  understanding of the education provided. See Electronic Medical Record under Patient Instructions for exercises provided during therapy sessions    Assessment     Supervisory visit with TEODORO VAZQUEZ, PT, DPT prior to initial PTA visit.     Patient arrived today with reports of  tightness in her legs this morning. Today is her second treatment since evaluation with PLAN OF CARE continued today.  Therapist initiated the Basic back mat and stretching Home Exercise Program at her last visit. Verbal and tactile cues provided for proper form for all the exercises. She was able to return demonstration well but does struggle a lot with the LLE more than the right. Therapist reviewed these exercises today with Patient and Patient tolerated this well. Added Theraband rows and extension/retraction due to Patient inquiring about what exercises she could perform at home due to purchasing a set of bands at [facility].  Will continue to progress as able.       Andressa is a 54 y.o. female referred to outpatient Physical Therapy with a medical diagnosis of Lumbar Radiculopathy.  Honey reports: she has been having back pain for years. She underwent lumbar laminectomy in 2007 and a Fusion in 2008. She was mostly pain free for years but recently the back pain has started to increase to the point that she is now having trouble standing and walking. She has bilateral Lower Extremity radiculopathy and numbness with prolonged standing. Numbness is worse on the Left Lower Extremity.  She did see a Neurosurgeon, Dr Carrillo, at [facility]. X Rays did show Grade 1 anterolisthesis of L5. Neuro MD wants her to try therapy and injections first but there is a lot of degeneration and the MD feels like she will likely need surgical intervention in the future. MD ordered Outpatient Physical Therapy and patient is here today for the Evaluation.   Patient presents with decreased lumbosacral mobility in all directions with Extension being the most limited and the most painful. She also has significant back pain following forward flexion when she is trying to come back up to a neutral position. Patient has decreased strength in Back and Abdominals. She has the added complication of Diastasis Recti in the abdominals. Patient has decreased Lower Extremity strength with Left weaker than the Right. During standing and walking, patient is slightly flexed forward at the hips with inability to stand fully upright due to pain and core weakness. She has numbness in bilateral Lower Extremities with prolonged standing with left worse than right. Patient is tender to palpation along lumbar paraspinals, bilateral SI, and Bilateral Piriformis with Left more painful than the Right. During special tests today, patient is positive for Straight Leg Raise test, seated slump test, and SI testing.   Patient will require Physical Therapy Intervention to address all deficits and work toward completion of all goals set. Therapist will refer patient back to MD upon completion of Therapy for further assessment, injections, and possible surgery  I if pain and dysfunction persist.       Andressa Is progressing well towards her goals.   Patient prognosis is Good.     Patient will continue to benefit from skilled outpatient physical therapy to address the deficits listed in the problem list box on initial evaluation, provide pt/family education and to maximize pt's level of independence in the home and community environment.     Patient's spiritual, cultural and educational needs considered and pt agreeable to plan of care and goals.     Anticipated Barriers for therapy: Anterolisthesis of L5 on Si; Chronic Back Pain; Degenerative changes; need for injections; likely need for lumbar surgery.  Goals:  Short Term Goals: 4 weeks   1. Patient will be Independent with Home Exercise Program   2. Patient will demonstrate with improved Posture and Body Mechanics  3. Patient will increase Lumbosacral Range of Motion by 25%   4. Patient will increase Lower Extremity and Core Strength to grossly 4+/5  5. Patient will decrease complaints of pain with activity to Less than or Equal to 5/10      Long Term Goals: 8 weeks   1. Patient will tolerate 30 minutes of activity with complaints of Pain at Less Than or Equal to 4/10    Plan     Plan of care Certification: 1/2/2025 to 2/28/2025.     Outpatient Physical Therapy 2 times weekly for 8 weeks to include the following interventions: 40800 [therapeutic exercise], 95091 [neuromuscular re-education], 49894 [manual therapy], 71523 [therapeutic activities], 75838 [aquatic therapy], 75509 [unattended electrical stimulation], and 31462 [mechanical traction]    Mikel Hill PTA

## 2025-01-24 ENCOUNTER — CLINICAL SUPPORT (OUTPATIENT)
Dept: REHABILITATION | Facility: HOSPITAL | Age: 55
End: 2025-01-24
Payer: MEDICARE

## 2025-01-24 DIAGNOSIS — M54.16 LUMBAR RADICULOPATHY: Primary | ICD-10-CM

## 2025-01-24 PROCEDURE — 97012 MECHANICAL TRACTION THERAPY: CPT | Mod: CQ

## 2025-01-24 PROCEDURE — 97110 THERAPEUTIC EXERCISES: CPT | Mod: CQ

## 2025-01-24 NOTE — PROGRESS NOTES
[facility]   Physical Therapy Treatment Note      Name: Andressa Handley  Clinic Number: [id number]  Visit Date: 1/24/2025  Therapy Diagnosis:Lumbar Radiculopathy   Physician: Michelle Escobar FNP*     Physician Orders: PT Eval and Treat   Medical Diagnosis from Referral: Lumbar Radiculopathy   Evaluation Date: 1/2/2025  Authorization Period Expiration: 12/11/2025   Plan of Care Expiration: 2/28/2025   Visit # / Visits authorized: 4/ 16 - [facility] Managed    PTA Visit #: 3/5   FOTO: 35/100     Precautions: Multiple previous spinal and joint surgeries       Time In: 9:34 am   Time Out: 10:15  am  Total Appointment Time (timed & untimed codes): 41 minutes    Subjective     Patient reports: I think I over did it Tuesday. I did my home exercise program and I have been hurting every since.  She was compliant with home exercise program.  Response to previous treatment: less stiffness in the lower extremity   Functional change: n/a    Pain: 5/10  Location: bilateral back and right hip.    Objective      Objective Measures updated at progress report unless specified.     Treatment     Andressa received the treatments listed below:      therapeutic exercises to develop strength, endurance, ROM, flexibility, posture, and core stabilization for 31 minutes including:    Back Exercises    Bike/NuStep    Nustep x 6 minutes              Calf Stretch 4 x 15 second hold    Hamstring Stretch 4 x 15 second hold    Seated ball rollouts  5 x 5 second hold fwd/left/right            Supine piriformis stretch  2 x 20 second hold (B) NTV       Pelvic Tilts X 15   Bridging X 10   Bridging with Abduction X 10 NTV   Bridging/Hooklying with Marching X 10 NTV   Bridging/Hooklying with Knee Ext X 10 each (B)NTV   Trunk Rotation Exercise X 20    Trunk Rotation Stretch 2 x 15 second hold    Ball - Trunk Rotation    Ball- Knee Extension    Planks    Quadruped    Hooklying abduction  X 20 green bandNTV   Home exercise program  review  X 10 minutes         manual therapy techniques: - were applied to the: - for - minutes, including:      neuromuscular re-education activities to improve: Balance, Coordination, Kinesthetic, Sense, Proprioception, and Posture for - minutes. The following activities were included:      therapeutic activities to improve functional performance for -  minutes, including:    Traction 65# lumbar for 10 minutes      Patient Education and Home Exercises       Education provided:   - basic back mat and stretching home exercise program handouts given on 1/14. Added Theraband rows and extension/retraction due to Patient inquiring about what exercises she could perform at home due to purchasing a set of bands at [facility].      Written Home Exercises Provided: Pt instructed to continue prior HEP. Exercises were reviewed and Andressa was able to demonstrate them prior to the end of the session.  Andressa demonstrated good  understanding of the education provided. See Electronic Medical Record under Patient Instructions for exercises provided during therapy sessions    Assessment     Patient stated when she performed her bridges at her home she felt tingling in her feet. Pt stated she did her home exercise program on Tuesday since her appointment was cancelled due to weather. Mechanical traction was added to today's visit to relieve her pain. Patient had trouble lifting legs when put in traction as well as getting off the bed, assistance was given. Pt felt sore after traction and was told to take notice how traction would make her feel the next day.     Supervisory visit with TEODORO VAZQUEZ, PT, DPT prior to initial PTA visit.     Patient arrived today with reports of  tightness in her legs this morning. Today is her second treatment since evaluation with PLAN OF CARE continued today.  Therapist initiated the Basic back mat and stretching Home Exercise Program at her last visit. Verbal and tactile cues provided for proper form for  all the exercises. She was able to return demonstration well but does struggle a lot with the LLE more than the right. Therapist reviewed these exercises today with Patient and Patient tolerated this well. Added Theraband rows and extension/retraction due to Patient inquiring about what exercises she could perform at home due to purchasing a set of bands at [facility].  Will continue to progress as able.       Andressa is a 54 y.o. female referred to outpatient Physical Therapy with a medical diagnosis of Lumbar Radiculopathy. Andressa reports: she has been having back pain for years. She underwent lumbar laminectomy in 2007 and a Fusion in 2008. She was mostly pain free for years but recently the back pain has started to increase to the point that she is now having trouble standing and walking. She has bilateral Lower Extremity radiculopathy and numbness with prolonged standing. Numbness is worse on the Left Lower Extremity.  She did see a Neurosurgeon, Dr Carrillo, at [facility]. X Rays did show Grade 1 anterolisthesis of L5. Neuro MD wants her to try therapy and injections first but there is a lot of degeneration and the MD feels like she will likely need surgical intervention in the future. MD ordered Outpatient Physical Therapy and patient is here today for the Evaluation.   Patient presents with decreased lumbosacral mobility in all directions with Extension being the most limited and the most painful. She also has significant back pain following forward flexion when she is trying to come back up to a neutral position. Patient has decreased strength in Back and Abdominals. She has the added complication of Diastasis Recti in the abdominals. Patient has decreased Lower Extremity strength with Left weaker than the Right. During standing and walking, patient is slightly flexed forward at the hips with inability to stand fully upright due to pain and core weakness. She has numbness in bilateral Lower Extremities with prolonged  standing with left worse than right. Patient is tender to palpation along lumbar paraspinals, bilateral SI, and Bilateral Piriformis with Left more painful than the Right. During special tests today, patient is positive for Straight Leg Raise test, seated slump test, and SI testing.   Patient will require Physical Therapy Intervention to address all deficits and work toward completion of all goals set. Therapist will refer patient back to MD upon completion of Therapy for further assessment, injections, and possible surgery I if pain and dysfunction persist.       Andressa Is progressing well towards her goals.   Patient prognosis is Good.     Patient will continue to benefit from skilled outpatient physical therapy to address the deficits listed in the problem list box on initial evaluation, provide pt/family education and to maximize pt's level of independence in the home and community environment.     Patient's spiritual, cultural and educational needs considered and pt agreeable to plan of care and goals.     Anticipated Barriers for therapy: Anterolisthesis of L5 on Si; Chronic Back Pain; Degenerative changes; need for injections; likely need for lumbar surgery.  Goals:  Short Term Goals: 4 weeks   1. Patient will be Independent with Home Exercise Program   2. Patient will demonstrate with improved Posture and Body Mechanics  3. Patient will increase Lumbosacral Range of Motion by 25%   4. Patient will increase Lower Extremity and Core Strength to grossly 4+/5  5. Patient will decrease complaints of pain with activity to Less than or Equal to 5/10      Long Term Goals: 8 weeks   1. Patient will tolerate 30 minutes of activity with complaints of Pain at Less Than or Equal to 4/10    Plan     Plan of care Certification: 1/2/2025 to 2/28/2025.     Outpatient Physical Therapy 2 times weekly for 8 weeks to include the following interventions: 11580 [therapeutic exercise], 15042 [neuromuscular re-education], 86069  [manual therapy], 15744 [therapeutic activities], 60666 [aquatic therapy], 96989 [unattended electrical stimulation], and 31557 [mechanical traction]    Angelica Gimenez PTA

## 2025-01-31 ENCOUNTER — CLINICAL SUPPORT (OUTPATIENT)
Dept: REHABILITATION | Facility: HOSPITAL | Age: 55
End: 2025-01-31
Payer: MEDICARE

## 2025-01-31 DIAGNOSIS — R29.898 WEAKNESS OF BACK: ICD-10-CM

## 2025-01-31 DIAGNOSIS — R29.898 WEAKNESS OF BOTH LOWER EXTREMITIES: ICD-10-CM

## 2025-01-31 DIAGNOSIS — M54.16 LUMBAR RADICULOPATHY: Primary | ICD-10-CM

## 2025-01-31 PROCEDURE — 97110 THERAPEUTIC EXERCISES: CPT | Mod: CQ

## 2025-01-31 PROCEDURE — 97112 NEUROMUSCULAR REEDUCATION: CPT | Mod: CQ

## 2025-01-31 NOTE — PROGRESS NOTES
[facility]   Physical Therapy Treatment Note      Name: Andressa Handley  Clinic Number: [id number]  Visit Date: 1/31/2025  Therapy Diagnosis:Lumbar Radiculopathy   Physician: Michelle Escobar FNP*     Physician Orders: PT Eval and Treat   Medical Diagnosis from Referral: Lumbar Radiculopathy   Evaluation Date: 1/2/2025  Authorization Period Expiration: 12/11/2025   Plan of Care Expiration: 2/28/2025   Visit # / Visits authorized: 4/ 16 - [facility] Man[facility]    PTA Visit #: 4/5   FOTO: 35/100     Precautions: Multiple previous spinal and joint surgeries       Time In: 09:33 am   Time Out: 10:16 am  Total Appointment Time (timed & untimed codes): 43 minutes    Subjective     Patient reports: she got a shot on Wednesday when she went to the doctor in [place]. She still is having pain in her back.   She was compliant with home exercise program.  Response to previous treatment: less stiffness in the lower extremity   Functional change: n/a    Pain: 4/10  Location: bilateral back and right hip.    Objective      Objective Measures updated at progress report unless specified.     Treatment     Andressa received the treatments listed below:      therapeutic exercises to develop strength, endurance, ROM, flexibility, posture, and core stabilization for 20 minutes including:    Back Exercises    Bike/NuStep Nustep x 5 minutes              Calf Stretch 4 x 15 second hold    Hamstring Stretch 4 x 15 second hold    Seated ball rollouts  5 x 5 second hold fwd/left/right    DKTC with red peanut  X 20           Supine piriformis stretch  2 x 20 second hold (B)                            Trunk Rotation Exercise X 20    Trunk Rotation Stretch 2 x 15 second hold    Ball - Trunk Rotation    Ball- Knee Extension    Planks    Quadruped    Hooklying abduction + tilt X 20 green band   Sciatic nerve flossing (LLE)  X 20 with lower extremity over red peanut ball        manual therapy techniques: - were applied to the: - for  - minutes, including:      neuromuscular re-education activities to improve: Balance, Coordination, Kinesthetic, Sense, Proprioception, and Posture for 23 minutes. The following activities were included:  Pelvic Tilts X 20 over red peanut ball and MHP   Bridging X 20 over red peanut ball and MHP   Bridging with Abduction X 10 NTV   Bridging/Hooklying with Marching X 10 ea (B) over MHP    Bridging/Hooklying with Knee Ext X 10 each (B) over MHP     Cybex rows close - 2 plates x 20   Cybex rows wide- 2 plates x 20     therapeutic activities to improve functional performance for -  minutes, including:    Traction 65# lumbar for 0 minutes      Patient Education and Home Exercises       Education provided:   - basic back mat and stretching home exercise program handouts given on 1/14. Added Theraband rows and extension/retraction due to Patient inquiring about what exercises she could perform at home due to purchasing a set of bands at [facility].      Written Home Exercises Provided: Pt instructed to continue prior HEP. Exercises were reviewed and Andressa was able to demonstrate them prior to the end of the session.  Andressa demonstrated good  understanding of the education provided. See Electronic Medical Record under Patient Instructions for exercises provided during therapy sessions    Assessment       Patient arrived to therapy today with reports of 4/10 pain in the lumbar area. She got a shot on Wednesday when she went to the doctor in [place]. She thinks she overdid it with her home exercise program last week and was sore from this. Therapist modified her mat exercises today to better accommodate her pain with use of MHP in supine with multiple towel layers. Patient was better able to tolerate these exercises as she continues to have a lot of SI pain and radiculopathy into the lower extremity. Therapist added the red peanut ball today and had Patient perform her pelvic tilts, marches, and nerve flossing knee extension's  over this. Also added in the Cybex row machine to address strengthening of the posterior chain. She responded well to tx today. Will continue to progress as able.     Andressa is a 54 y.o. female referred to outpatient Physical Therapy with a medical diagnosis of Lumbar Radiculopathy. Andressa reports: she has been having back pain for years. She underwent lumbar laminectomy in 2007 and a Fusion in 2008. She was mostly pain free for years but recently the back pain has started to increase to the point that she is now having trouble standing and walking. She has bilateral Lower Extremity radiculopathy and numbness with prolonged standing. Numbness is worse on the Left Lower Extremity.  She did see a Neurosurgeon, Dr Carrillo, at [facility]. X Rays did show Grade 1 anterolisthesis of L5. Neuro MD wants her to try therapy and injections first but there is a lot of degeneration and the MD feels like she will likely need surgical intervention in the future. MD ordered Outpatient Physical Therapy and patient is here today for the Evaluation.   Patient presents with decreased lumbosacral mobility in all directions with Extension being the most limited and the most painful. She also has significant back pain following forward flexion when she is trying to come back up to a neutral position. Patient has decreased strength in Back and Abdominals. She has the added complication of Diastasis Recti in the abdominals. Patient has decreased Lower Extremity strength with Left weaker than the Right. During standing and walking, patient is slightly flexed forward at the hips with inability to stand fully upright due to pain and core weakness. She has numbness in bilateral Lower Extremities with prolonged standing with left worse than right. Patient is tender to palpation along lumbar paraspinals, bilateral SI, and Bilateral Piriformis with Left more painful than the Right. During special tests today, patient is positive for Straight Leg Raise  test, seated slump test, and SI testing.   Patient will require Physical Therapy Intervention to address all deficits and work toward completion of all goals set. Therapist will refer patient back to MD upon completion of Therapy for further assessment, injections, and possible surgery I if pain and dysfunction persist.       Andressa Is progressing well towards her goals.   Patient prognosis is Good.     Patient will continue to benefit from skilled outpatient physical therapy to address the deficits listed in the problem list box on initial evaluation, provide pt/family education and to maximize pt's level of independence in the home and community environment.     Patient's spiritual, cultural and educational needs considered and pt agreeable to plan of care and goals.     Anticipated Barriers for therapy: Anterolisthesis of L5 on Si; Chronic Back Pain; Degenerative changes; need for injections; likely need for lumbar surgery.  Goals:  Short Term Goals: 4 weeks   1. Patient will be Independent with Home Exercise Program   2. Patient will demonstrate with improved Posture and Body Mechanics  3. Patient will increase Lumbosacral Range of Motion by 25%   4. Patient will increase Lower Extremity and Core Strength to grossly 4+/5  5. Patient will decrease complaints of pain with activity to Less than or Equal to 5/10      Long Term Goals: 8 weeks   1. Patient will tolerate 30 minutes of activity with complaints of Pain at Less Than or Equal to 4/10    Plan     Plan of care Certification: 1/2/2025 to 2/28/2025.     Outpatient Physical Therapy 2 times weekly for 8 weeks to include the following interventions: 66835 [therapeutic exercise], 50526 [neuromuscular re-education], 35232 [manual therapy], 16844 [therapeutic activities], 81555 [aquatic therapy], 04822 [unattended electrical stimulation], and 62123 [mechanical traction]    Mikel Hill PTA

## 2025-02-04 ENCOUNTER — CLINICAL SUPPORT (OUTPATIENT)
Dept: REHABILITATION | Facility: HOSPITAL | Age: 55
End: 2025-02-04
Payer: MEDICARE

## 2025-02-04 DIAGNOSIS — M54.16 LUMBAR RADICULOPATHY: Primary | ICD-10-CM

## 2025-02-04 PROCEDURE — 97140 MANUAL THERAPY 1/> REGIONS: CPT

## 2025-02-04 PROCEDURE — 97112 NEUROMUSCULAR REEDUCATION: CPT

## 2025-02-04 PROCEDURE — 97110 THERAPEUTIC EXERCISES: CPT

## 2025-02-04 NOTE — PROGRESS NOTES
[facility]   Physical Therapy Treatment Note      Name: Andressa Handley  Clinic Number: [id number]  Visit Date: 2/4/2025  Therapy Diagnosis:Lumbar Radiculopathy   Physician: Michelle Escobar FNP*     Physician Orders: PT Eval and Treat   Medical Diagnosis from Referral: Lumbar Radiculopathy   Evaluation Date: 1/2/2025  Authorization Period Expiration: 12/11/2025   Plan of Care Expiration: 2/28/2025   Visit # / Visits authorized: 5/ 16 - [facility]    PTA Visit #: 0/5   FOTO: 35/100     Precautions: Multiple previous spinal and joint surgeries       Time In: 11:33 am   Time Out: 12:18 am  Total Appointment Time (timed & untimed codes): 45 minutes    Subjective     Patient reports: she a spinal injection 1/29 at the pain doctor in [place]. Back pain remains consistent but is a little better than it was last week.  She was compliant with home exercise program.  Response to previous treatment: less stiffness in the lower extremity   Functional change: n/a    Pain: 3/10 at rest   Location: bilateral back and right hip.    Objective      Objective Measures updated at progress report unless specified.     Treatment     Andressa received the treatments listed below:      therapeutic exercises to develop strength, endurance, ROM, flexibility, posture, and core stabilization for 15 minutes including:    Back Exercises    Bike/NuStep Nustep x 5 minutes              Calf Stretch 4 x 15 second hold    Hamstring Stretch 4 x 15 second hold    Seated ball rollouts  5 x 5 second hold fwd/left/right    DKTC with red peanut  X 20           Supine piriformis stretch  2 x 20 second hold (B)                            Trunk Rotation Exercise X 20    Trunk Rotation Stretch 2 x 15 second hold    Ball - Trunk Rotation    Ball- Knee Extension    Planks    Quadruped    Hooklying abduction + tilt X 20 green band   Sciatic nerve flossing (LLE)  X 20 with lower extremity over red peanut ball        manual therapy  techniques: - were applied to the: - for - 10 minutes, including:    Leg Length measurement and assessment of the SI       neuromuscular re-education activities to improve: Balance, Coordination, Kinesthetic, Sense, Proprioception, and Posture for 20 minutes. The following activities were included:  Pelvic Tilts X 20 over red peanut ball and MHP   Bridging X 20 over red peanut ball and MHP   Bridging with Abduction X 10 NTV   Bridging/Hooklying with Marching X 10 ea (B) over MHP    Bridging/Hooklying with Knee Ext X 10 each (B) over MHP     Cybex rows close - 2 plates x 20   Cybex rows wide- 2 plates x 20     therapeutic activities to improve functional performance for -  minutes, including:    Traction 65# lumbar for 0 minutes      Patient Education and Home Exercises       Education provided:   - basic back mat and stretching home exercise program handouts given on 1/14. Added Theraband rows and extension/retraction due to Patient inquiring about what exercises she could perform at home due to purchasing a set of bands at [facility].      Written Home Exercises Provided: Pt instructed to continue prior HEP. Exercises were reviewed and Andressa was able to demonstrate them prior to the end of the session.  Andressa demonstrated good  understanding of the education provided. See Electronic Medical Record under Patient Instructions for exercises provided during therapy sessions    Assessment     Patient reports continued back pain but this is a little better since getting her injection last week. Therapist concentrated mostly on core strengthening today. She did well with this but has to move slow and she is very guarded with all exercises. While in hooklying position, she had the appearance of Right Lower Extremity shorter than left. Therapist measured Leg Length today from ASIS to Medial Malleoli Bilaterally and measurements were 75 cm each. She could possibly have Left sided anterior rotation of the SI. Will re-assess this  over the next few visits. Plan to continue with core strengthening and traction as tolerated. Sup Visit performed today with JUAN C Curry, JUAN C Morales, and JUAN C Mcclain.  All goals and treatment plan reviewed. Will work toward completion of all goals set.          Andressa is a 54 y.o. female referred to outpatient Physical Therapy with a medical diagnosis of Lumbar Radiculopathy. Andressa reports: she has been having back pain for years. She underwent lumbar laminectomy in 2007 and a Fusion in 2008. She was mostly pain free for years but recently the back pain has started to increase to the point that she is now having trouble standing and walking. She has bilateral Lower Extremity radiculopathy and numbness with prolonged standing. Numbness is worse on the Left Lower Extremity.  She did see a Neurosurgeon, Dr Carrillo, at [facility]. X Rays did show Grade 1 anterolisthesis of L5. Neuro MD wants her to try therapy and injections first but there is a lot of degeneration and the MD feels like she will likely need surgical intervention in the future. MD ordered Outpatient Physical Therapy and patient is here today for the Evaluation.   Patient presents with decreased lumbosacral mobility in all directions with Extension being the most limited and the most painful. She also has significant back pain following forward flexion when she is trying to come back up to a neutral position. Patient has decreased strength in Back and Abdominals. She has the added complication of Diastasis Recti in the abdominals. Patient has decreased Lower Extremity strength with Left weaker than the Right. During standing and walking, patient is slightly flexed forward at the hips with inability to stand fully upright due to pain and core weakness. She has numbness in bilateral Lower Extremities with prolonged standing with left worse than right. Patient is tender to palpation along lumbar paraspinals, bilateral SI, and Bilateral  Piriformis with Left more painful than the Right. During special tests today, patient is positive for Straight Leg Raise test, seated slump test, and SI testing.   Patient will require Physical Therapy Intervention to address all deficits and work toward completion of all goals set. Therapist will refer patient back to MD upon completion of Therapy for further assessment, injections, and possible surgery I if pain and dysfunction persist.       Andressa Is progressing well towards her goals.   Patient prognosis is Good.     Patient will continue to benefit from skilled outpatient physical therapy to address the deficits listed in the problem list box on initial evaluation, provide pt/family education and to maximize pt's level of independence in the home and community environment.     Patient's spiritual, cultural and educational needs considered and pt agreeable to plan of care and goals.     Anticipated Barriers for therapy: Anterolisthesis of L5 on Si; Chronic Back Pain; Degenerative changes; need for injections; likely need for lumbar surgery.  Goals:  Short Term Goals: 4 weeks   1. Patient will be Independent with Home Exercise Program   2. Patient will demonstrate with improved Posture and Body Mechanics  3. Patient will increase Lumbosacral Range of Motion by 25%   4. Patient will increase Lower Extremity and Core Strength to grossly 4+/5  5. Patient will decrease complaints of pain with activity to Less than or Equal to 5/10      Long Term Goals: 8 weeks   1. Patient will tolerate 30 minutes of activity with complaints of Pain at Less Than or Equal to 4/10    Plan     Plan of care Certification: 1/2/2025 to 2/28/2025.     Outpatient Physical Therapy 2 times weekly for 8 weeks to include the following interventions: 92622 [therapeutic exercise], 02218 [neuromuscular re-education], 00084 [manual therapy], 27343 [therapeutic activities], 57223 [aquatic therapy], 83197 [unattended electrical stimulation], and 51390  [mechanical traction]    TEODORO VAZQUEZ, PT

## 2025-02-07 ENCOUNTER — CLINICAL SUPPORT (OUTPATIENT)
Dept: REHABILITATION | Facility: HOSPITAL | Age: 55
End: 2025-02-07
Payer: MEDICARE

## 2025-02-07 DIAGNOSIS — R29.898 WEAKNESS OF BACK: ICD-10-CM

## 2025-02-07 DIAGNOSIS — R29.898 WEAKNESS OF BOTH LOWER EXTREMITIES: ICD-10-CM

## 2025-02-07 DIAGNOSIS — M54.16 LUMBAR RADICULOPATHY: Primary | ICD-10-CM

## 2025-02-07 PROCEDURE — 97140 MANUAL THERAPY 1/> REGIONS: CPT | Mod: CQ

## 2025-02-07 PROCEDURE — 97112 NEUROMUSCULAR REEDUCATION: CPT | Mod: CQ

## 2025-02-07 PROCEDURE — 97110 THERAPEUTIC EXERCISES: CPT | Mod: CQ

## 2025-02-07 NOTE — PROGRESS NOTES
[facility]   Physical Therapy Treatment Note      Name: Andressa Handley  Clinic Number: [id number]  Visit Date: 2/7/2025  Therapy Diagnosis:Lumbar Radiculopathy   Physician: Michelle Escobar FNP*     Physician Orders: PT Eval and Treat   Medical Diagnosis from Referral: Lumbar Radiculopathy   Evaluation Date: 1/2/2025  Authorization Period Expiration: 12/11/2025   Plan of Care Expiration: 2/28/2025   Visit # / Visits authorized: 6/ 16 - [facility]    PTA Visit #: 1/5   FOTO: 35/100     Precautions: Multiple previous spinal and joint surgeries       Time In: 09:34 am   Time Out: 10:16 am  Total Appointment Time (timed & untimed codes): 42 minutes    Subjective     Patient reports: no better no worse. She still has pain but it is not as excruciating as it was before the injection.   She was compliant with home exercise program.  Response to previous treatment: less stiffness in the lower extremity   Functional change: n/a    Pain: 3/10 at rest   Location: bilateral back and right hip.    Objective      Objective Measures updated at progress report unless specified.     Treatment     Andressa received the treatments listed below:      therapeutic exercises to develop strength, endurance, ROM, flexibility, posture, and core stabilization for 12 minutes including:    Back Exercises    Bike/NuStep Nustep x 5 minutes              Calf Stretch 4 x 15 second hold    Hamstring Stretch 4 x 15 second hold    Seated ball rollouts  5 x 5 second hold fwd/left/right    DKTC with red peanut  X 20                                   Trunk Rotation Exercise X 20    Ball - Trunk Rotation    Ball- Knee Extension    Planks    Quadruped    Hooklying abduction + tilt X 20 green band   Sciatic nerve flossing (LLE)  X 20 with lower extremity over red peanut ball (not performed today)         manual therapy techniques: - were applied to the: - for - 10 minutes, including:    Leg Length measurement and assessment of  the SI   MET for left anterior rotation - isometric hip extensors on the left paired with isometric hip flexors on right       neuromuscular re-education activities to improve: Balance, Coordination, Kinesthetic, Sense, Proprioception, and Posture for 20 minutes. The following activities were included:  Pelvic Tilts X 20    Bridging X 20   Bridging with Abduction X 10 NTV   Bridging/Hooklying with Marching X 10 ea (B) green     Cybex rows close - 2 plates x 20 (not performed today)   Cybex rows wide- 2 plates x 20 (not performed today)     therapeutic activities to improve functional performance for -  minutes, including:    Traction 65# lumbar for 0 minutes      Patient Education and Home Exercises       Education provided:   - basic back mat and stretching home exercise program handouts given on 1/14. Added Theraband rows and extension/retraction due to Patient inquiring about what exercises she could perform at home due to purchasing a set of bands at [facility].      Written Home Exercises Provided: Pt instructed to continue prior HEP. Exercises were reviewed and Andressa was able to demonstrate them prior to the end of the session.  Andressa demonstrated good  understanding of the education provided. See Electronic Medical Record under Patient Instructions for exercises provided during therapy sessions    Assessment       Patient reports continued back pain no better no worse.  Therapist concentrated mostly on core strengthening today. She did well with this but has to move slow and she is very guarded with all exercises.  Therapist measured Leg Length on 2/4 from ASIS to Medial Malleoli Bilaterally and measurements were 75 cm each which remained unchanged today. Upon palpation of the PSIS, it appears but could possibly have Left sided anterior rotation of the SI. Therapist performed MET for left anterior rotation today - isometric hip extensors on the left paired with isometric hip flexors on right. No discomfort noted  during this. Showed Patient how she could use PVC pipe or broom to do this at home.  Will re-assess this over the next few visits. Plan to continue with core strengthening and traction as tolerated.         Andressa is a 54 y.o. female referred to outpatient Physical Therapy with a medical diagnosis of Lumbar Radiculopathy. Andressa reports: she has been having back pain for years. She underwent lumbar laminectomy in 2007 and a Fusion in 2008. She was mostly pain free for years but recently the back pain has started to increase to the point that she is now having trouble standing and walking. She has bilateral Lower Extremity radiculopathy and numbness with prolonged standing. Numbness is worse on the Left Lower Extremity.  She did see a Neurosurgeon, Dr Carrillo, at [facility]. X Rays did show Grade 1 anterolisthesis of L5. Neuro MD wants her to try therapy and injections first but there is a lot of degeneration and the MD feels like she will likely need surgical intervention in the future. MD ordered Outpatient Physical Therapy and patient is here today for the Evaluation.   Patient presents with decreased lumbosacral mobility in all directions with Extension being the most limited and the most painful. She also has significant back pain following forward flexion when she is trying to come back up to a neutral position. Patient has decreased strength in Back and Abdominals. She has the added complication of Diastasis Recti in the abdominals. Patient has decreased Lower Extremity strength with Left weaker than the Right. During standing and walking, patient is slightly flexed forward at the hips with inability to stand fully upright due to pain and core weakness. She has numbness in bilateral Lower Extremities with prolonged standing with left worse than right. Patient is tender to palpation along lumbar paraspinals, bilateral SI, and Bilateral Piriformis with Left more painful than the Right. During special tests today,  patient is positive for Straight Leg Raise test, seated slump test, and SI testing.   Patient will require Physical Therapy Intervention to address all deficits and work toward completion of all goals set. Therapist will refer patient back to MD upon completion of Therapy for further assessment, injections, and possible surgery I if pain and dysfunction persist.       Andressa Is progressing well towards her goals.   Patient prognosis is Good.     Patient will continue to benefit from skilled outpatient physical therapy to address the deficits listed in the problem list box on initial evaluation, provide pt/family education and to maximize pt's level of independence in the home and community environment.     Patient's spiritual, cultural and educational needs considered and pt agreeable to plan of care and goals.     Anticipated Barriers for therapy: Anterolisthesis of L5 on Si; Chronic Back Pain; Degenerative changes; need for injections; likely need for lumbar surgery.  Goals:  Short Term Goals: 4 weeks   1. Patient will be Independent with Home Exercise Program   2. Patient will demonstrate with improved Posture and Body Mechanics  3. Patient will increase Lumbosacral Range of Motion by 25%   4. Patient will increase Lower Extremity and Core Strength to grossly 4+/5  5. Patient will decrease complaints of pain with activity to Less than or Equal to 5/10      Long Term Goals: 8 weeks   1. Patient will tolerate 30 minutes of activity with complaints of Pain at Less Than or Equal to 4/10    Plan     Plan of care Certification: 1/2/2025 to 2/28/2025.     Outpatient Physical Therapy 2 times weekly for 8 weeks to include the following interventions: 31538 [therapeutic exercise], 40059 [neuromuscular re-education], 86529 [manual therapy], 20106 [therapeutic activities], 24753 [aquatic therapy], 21179 [unattended electrical stimulation], and 17198 [mechanical traction]    Mikle Hill PTA

## 2025-02-11 ENCOUNTER — CLINICAL SUPPORT (OUTPATIENT)
Dept: REHABILITATION | Facility: HOSPITAL | Age: 55
End: 2025-02-11
Payer: MEDICARE

## 2025-02-11 DIAGNOSIS — R29.898 WEAKNESS OF BOTH LOWER EXTREMITIES: ICD-10-CM

## 2025-02-11 DIAGNOSIS — M54.16 LUMBAR RADICULOPATHY: Primary | ICD-10-CM

## 2025-02-11 DIAGNOSIS — R29.898 WEAKNESS OF BACK: ICD-10-CM

## 2025-02-11 PROCEDURE — 97110 THERAPEUTIC EXERCISES: CPT

## 2025-02-11 PROCEDURE — 97112 NEUROMUSCULAR REEDUCATION: CPT

## 2025-02-11 PROCEDURE — 97140 MANUAL THERAPY 1/> REGIONS: CPT

## 2025-02-11 NOTE — PROGRESS NOTES
[facility]   Physical Therapy Treatment Note      Name: Andressa Handley  Clinic Number: [id number]  Visit Date: 2/11/2025  Therapy Diagnosis:Lumbar Radiculopathy   Physician: Michelle Escobar FNP*     Physician Orders: PT Eval and Treat   Medical Diagnosis from Referral: Lumbar Radiculopathy   Evaluation Date: 1/2/2025  Authorization Period Expiration: 12/11/2025   Plan of Care Expiration: 2/28/2025   Visit # / Visits authorized: 7/ 16 - [facility]    PTA Visit #: 0/5   FOTO: 35/100     Precautions: Multiple previous spinal and joint surgeries       Time In: 11:35 am   Time Out: 12:15 am  Total Appointment Time (timed & untimed codes): 40 minutes    Subjective     Patient reports: most of her pain is felt in the Left SI area today.   She was compliant with home exercise program.  Response to previous treatment: less stiffness in the lower extremity   Functional change: n/a    Pain: 3-4/10 at rest   Location: bilateral back and right hip.    Objective      Objective Measures updated at progress report unless specified.     Treatment     Andressa received the treatments listed below:      therapeutic exercises to develop strength, endurance, ROM, flexibility, posture, and core stabilization for 10 minutes including:    Back Exercises    Bike/NuStep Nustep x 5 minutes              Calf Stretch 4 x 15 second hold    Hamstring Stretch 4 x 15 second hold    Seated ball rollouts  5 x 5 second hold fwd/left/right    DKTC with red peanut  X 20                                   Trunk Rotation Exercise X 20    Ball - Trunk Rotation    Ball- Knee Extension    Planks    Quadruped    Hooklying abduction + tilt X 20 green band   Sciatic nerve flossing (LLE)  X 20 with lower extremity over red peanut ball (not performed today)         manual therapy techniques: - were applied to the: - for - 15 minutes, including:    Leg Length measurement and assessment of the SI   MET for left anterior rotation -  "isometric hip extensors on the left paired with isometric hip flexors on right       neuromuscular re-education activities to improve: Balance, Coordination, Kinesthetic, Sense, Proprioception, and Posture for 15 minutes. The following activities were included:  Pelvic Tilts X 20    Bridging X 20   Bridging with Abduction X 10 NTV   Bridging/Hooklying with Marching X 10 ea (B) green     Cybex rows close - 2 plates x 20 (not performed today)   Cybex rows wide- 2 plates x 20 (not performed today)     therapeutic activities to improve functional performance for -  minutes, including:    Traction 65# lumbar for 0 minutes      Patient Education and Home Exercises       Education provided:   - basic back mat and stretching home exercise program handouts given on 1/14. Added Theraband rows and extension/retraction due to Patient inquiring about what exercises she could perform at home due to purchasing a set of bands at [facility].      Written Home Exercises Provided: Pt instructed to continue prior HEP. Exercises were reviewed and Andressa was able to demonstrate them prior to the end of the session.  Andressa demonstrated good  understanding of the education provided. See Electronic Medical Record under Patient Instructions for exercises provided during therapy sessions    Assessment       Patient reports pain mostly in Left SI today. SI was checked again today showing Left Anterior Rotation. Therapist performed MET today and cavitation was heard. She reports it felt "a little better" after the MET. Therapist then had her perform gentle core and hip strengthening. Upon further discussion with patient today, she told therapist that she had an especially difficult delivery with her 2nd child and has had pain off and on since that time. Feel like she may have some damage to the stabilizing pelvic ligaments of the SI from this. We will work on strengthening of the Core as well as the pelvic stabilizers. She is in agreement with this " plan. Sup Visit performed today with JUAN C Curry, JUAN C Morales, and JUAN C Mcclain.  All goals and treatment plan reviewed. Will work toward completion of all goals set.                PMH at time of Evaluation : Andressa is a 54 y.o. female referred to outpatient Physical Therapy with a medical diagnosis of Lumbar Radiculopathy. Andressa reports: she has been having back pain for years. She underwent lumbar laminectomy in 2007 and a Fusion in 2008. She was mostly pain free for years but recently the back pain has started to increase to the point that she is now having trouble standing and walking. She has bilateral Lower Extremity radiculopathy and numbness with prolonged standing. Numbness is worse on the Left Lower Extremity.  She did see a Neurosurgeon, Dr Carrillo, at [facility]. X Rays did show Grade 1 anterolisthesis of L5. Neuro MD wants her to try therapy and injections first but there is a lot of degeneration and the MD feels like she will likely need surgical intervention in the future. MD ordered Outpatient Physical Therapy and patient is here today for the Evaluation.   Patient presents with decreased lumbosacral mobility in all directions with Extension being the most limited and the most painful. She also has significant back pain following forward flexion when she is trying to come back up to a neutral position. Patient has decreased strength in Back and Abdominals. She has the added complication of Diastasis Recti in the abdominals. Patient has decreased Lower Extremity strength with Left weaker than the Right. During standing and walking, patient is slightly flexed forward at the hips with inability to stand fully upright due to pain and core weakness. She has numbness in bilateral Lower Extremities with prolonged standing with left worse than right. Patient is tender to palpation along lumbar paraspinals, bilateral SI, and Bilateral Piriformis with Left more painful than the Right. During  special tests today, patient is positive for Straight Leg Raise test, seated slump test, and SI testing.   Patient will require Physical Therapy Intervention to address all deficits and work toward completion of all goals set. Therapist will refer patient back to MD upon completion of Therapy for further assessment, injections, and possible surgery I if pain and dysfunction persist.       Andressa Is progressing well towards her goals.   Patient prognosis is Good.     Patient will continue to benefit from skilled outpatient physical therapy to address the deficits listed in the problem list box on initial evaluation, provide pt/family education and to maximize pt's level of independence in the home and community environment.     Patient's spiritual, cultural and educational needs considered and pt agreeable to plan of care and goals.     Anticipated Barriers for therapy: Anterolisthesis of L5 on Si; Chronic Back Pain; Degenerative changes; need for injections; likely need for lumbar surgery.  Goals:  Short Term Goals: 4 weeks   1. Patient will be Independent with Home Exercise Program   2. Patient will demonstrate with improved Posture and Body Mechanics  3. Patient will increase Lumbosacral Range of Motion by 25%   4. Patient will increase Lower Extremity and Core Strength to grossly 4+/5  5. Patient will decrease complaints of pain with activity to Less than or Equal to 5/10      Long Term Goals: 8 weeks   1. Patient will tolerate 30 minutes of activity with complaints of Pain at Less Than or Equal to 4/10    Plan     Plan of care Certification: 1/2/2025 to 2/28/2025.     Outpatient Physical Therapy 2 times weekly for 8 weeks to include the following interventions: 87924 [therapeutic exercise], 99223 [neuromuscular re-education], 81278 [manual therapy], 47503 [therapeutic activities], 31144 [aquatic therapy], 96256 [unattended electrical stimulation], and 10592 [mechanical traction]    TEODORO VAZQUEZ, PT

## 2025-03-12 ENCOUNTER — OFFICE VISIT (OUTPATIENT)
Dept: GASTROENTEROLOGY | Facility: CLINIC | Age: 55
End: 2025-03-12
Payer: MEDICARE

## 2025-03-12 VITALS
HEART RATE: 80 BPM | HEIGHT: 60 IN | DIASTOLIC BLOOD PRESSURE: 75 MMHG | SYSTOLIC BLOOD PRESSURE: 145 MMHG | BODY MASS INDEX: 34.95 KG/M2 | WEIGHT: 178 LBS | OXYGEN SATURATION: 97 %

## 2025-03-12 DIAGNOSIS — R13.19 ESOPHAGEAL DYSPHAGIA: Primary | ICD-10-CM

## 2025-03-12 PROCEDURE — 99214 OFFICE O/P EST MOD 30 MIN: CPT | Mod: S$PBB,,, | Performed by: NURSE PRACTITIONER

## 2025-03-12 PROCEDURE — 3008F BODY MASS INDEX DOCD: CPT | Mod: CPTII,,, | Performed by: NURSE PRACTITIONER

## 2025-03-12 PROCEDURE — 99213 OFFICE O/P EST LOW 20 MIN: CPT | Mod: PBBFAC | Performed by: NURSE PRACTITIONER

## 2025-03-12 PROCEDURE — 99999 PR PBB SHADOW E&M-EST. PATIENT-LVL III: CPT | Mod: PBBFAC,,, | Performed by: NURSE PRACTITIONER

## 2025-03-12 PROCEDURE — 3077F SYST BP >= 140 MM HG: CPT | Mod: CPTII,,, | Performed by: NURSE PRACTITIONER

## 2025-03-12 PROCEDURE — 3078F DIAST BP <80 MM HG: CPT | Mod: CPTII,,, | Performed by: NURSE PRACTITIONER

## 2025-03-12 NOTE — PROGRESS NOTES
"Andressa Handley is a 54 y.o. female here for Follow-up        PCP: Kristian Michael II  Referring Provider: No referring provider defined for this encounter.     HPI:  Presents for follow-up due to diarrhea.  Patient had abnormal fatty acid noted in stool.  She was prescribed Colestid.  States that she is unable to tolerate Colestid because she feels that it causes constipation in makes her feel "bad".  States that she stopped taking the Colestid.  Recommend fiber supplementation such as Metamucil.  She did have a colonoscopy on 11/14/2024 that showed an ulcerated pseudopolyp.  Patient has history of sigmoid colectomy due to recurrent diverticulitis.  Biopsies were negative.  Celiac labs negative.  Pancreatic elastase also normal.  Gastric emptying study on 01/15/2025 is normal.  Patient continues to report some early satiety.  She did have an EGD with dilation on 11/11/2024, 5 cm hiatal hernia, negative for H pylori.  The patient is reporting several incidences of dysphagia.  Patient has increased dysphagia with solid foods as well as liquids.  Discuss that we will schedule esophagram and then consider repeat EGD dilation.  Recommend that we schedule esophagram and assess for dysmotility.    Follow-up  Pertinent negatives include no abdominal pain, change in bowel habit, fatigue, fever, nausea or vomiting.         ROS:  Review of Systems   Constitutional:  Negative for appetite change, fatigue, fever and unexpected weight change.   HENT:  Positive for trouble swallowing.    Gastrointestinal:  Positive for diarrhea and reflux. Negative for abdominal pain, anal bleeding, blood in stool, change in bowel habit, constipation, nausea and vomiting.   Musculoskeletal:  Negative for gait problem.   Integumentary:  Negative for pallor.   Psychiatric/Behavioral:  The patient is not nervous/anxious.           PMHX:  has a past medical history of Anemia, Arthritis, Autoimmune disease, Cancer, Cerebral aneurysm, Chest pain, " "Headache, High cholesterol, Hypotension, iatrogenic, Kidney stones, Liver cyst, Muscle pain, and Neuropathy.    PSHX:  has a past surgical history that includes Tonsillectomy; Appendectomy; Cholecystectomy; Hysterectomy; Spine surgery; Cerebral aneurysm repair; Craniotomy; coiling of aneurysm; Cervical fusion; right shoulder repair; right knee repair; Thyroidectomy; internal hemorrhoid removal; oopherectomy; Bladder repair; and tvt repair.    PFHX: family history includes Dementia in her father; Lupus in her mother; Parkinsonism in her father.    PSlHX:  reports that she has quit smoking. She has never used smokeless tobacco. She reports that she does not drink alcohol and does not use drugs.        Review of patient's allergies indicates:   Allergen Reactions    Duloxetine Anaphylaxis and Other (See Comments)     Pt reports "night terrors",  "excessive sweating", fluid retention, and decreased urine output.      Modafinil Other (See Comments)     Suicidal thoughts      Dermabond [tissue glues] Rash    Fluocinolone acetonide Hives and Itching    Hydrocodone Other (See Comments)     Pt reports "locks my bowels".    Hydrocodone-acetaminophen     Morphine Nausea And Vomiting       Medication List with Changes/Refills   Current Medications    ACETAMINOPHEN (TYLENOL EXTRA STRENGTH) 500 MG PWPK    1,000 mg 3 (three) times daily.    ALPHA LIPOIC ACID 200 MG CAP        COLESTIPOL (COLESTID) 1 GRAM TAB    Take 1 tablet (1 g total) by mouth 2 (two) times daily.    DEXTROAMPHETAMINE (DEXTROSTAT) 10 MG TABLET    Take 10 mg by mouth 2 (two) times daily.    FOLIC ACID (FOLVITE) 1 MG TABLET    Take 1,000 mcg by mouth once daily.    LEVOTHYROXINE (SYNTHROID) 75 MCG TABLET    Take 1 tablet by mouth.    MELATONIN 10 MG CAP    Take by mouth.    METOPROLOL SUCCINATE (TOPROL-XL) 25 MG 24 HR TABLET    Take 25 mg by mouth once daily.    MULTIVITAMIN-IRON-FOLIC ACID TAB    Take 1 tablet by mouth once daily.    PANTOPRAZOLE (PROTONIX) 40 MG " TABLET    Take 1 tablet (40 mg total) by mouth once daily.    SECUKINUMAB (COSENTYX, 2 SYRINGES, SUBQ)    Inject 300 mg into the skin every 28 days.        Objective Findings:  Vital Signs:  BP (!) 145/75 (BP Location: Left arm, Patient Position: Sitting)   Pulse 80   Ht 5' (1.524 m)   Wt 80.7 kg (178 lb)   SpO2 97%   BMI 34.76 kg/m²  Body mass index is 34.76 kg/m².    Physical Exam:  Physical Exam  Vitals and nursing note reviewed.   Constitutional:       General: She is not in acute distress.     Appearance: Normal appearance.   HENT:      Mouth/Throat:      Mouth: Mucous membranes are moist.   Cardiovascular:      Rate and Rhythm: Normal rate.   Pulmonary:      Effort: Pulmonary effort is normal.   Abdominal:      General: Bowel sounds are normal. There is no distension.      Palpations: Abdomen is soft. There is no mass.      Tenderness: There is no abdominal tenderness.   Skin:     General: Skin is warm and dry.      Coloration: Skin is not jaundiced or pale.   Neurological:      Mental Status: She is alert and oriented to person, place, and time.   Psychiatric:         Mood and Affect: Mood normal.          Labs:  Lab Results   Component Value Date    WBC 8.76 10/10/2024    HGB 12.7 10/10/2024    HCT 40.5 10/10/2024    MCV 86.9 10/10/2024    RDW 13.5 10/10/2024     10/10/2024    LYMPH 18.6 (L) 10/10/2024    LYMPH 1.63 10/10/2024    MONO 11.1 (H) 10/10/2024    EOS 0.19 10/10/2024    BASO 0.08 10/10/2024     Lab Results   Component Value Date     10/10/2024    K 4.2 10/10/2024     (H) 10/10/2024    CO2 29 10/10/2024    GLU 91 10/10/2024    BUN 15 10/10/2024    CREATININE 0.87 10/10/2024    CALCIUM 9.1 10/10/2024    PROT 7.2 10/10/2024    ALBUMIN 3.7 10/10/2024    BILITOT 0.4 10/10/2024    ALKPHOS 98 10/10/2024    AST 30 10/10/2024    ALT 44 10/10/2024         Imaging: No results found.      Assessment:  Andressa Handley is a 54 y.o. female here with:  1. Esophageal dysphagia   "        Recommendations:  1. Esophagram  2. Consider repeat EGD with dilation following esophagram  Room temperature liquids. Small bites chew well  Continue Protonix  3. Increase fibers supplementation for diarrhea    Portions of this note may have been created with voice recognition software.  Occasional wrong word or "sound a like substitutions may have occurred due to inherent limitations of voice recognition software.  Please read the note carefully and recognize using contexts, where substitutions have occurred.    Diagnosis, risks, benefits, and side effects of any medications and treatment plan were discussed with the patient.  All questions were answered to the satisfaction of the patient, and patient verbalized understanding and agreement to the treatment plan.        Follow up in about 3 months (around 6/12/2025).      Order summary:  Orders Placed This Encounter    FL Esophagram Complete       Thank you for allowing me to participate in the care of Andressa Handley.      DEONTE Pena          "

## 2025-03-20 ENCOUNTER — HOSPITAL ENCOUNTER (OUTPATIENT)
Dept: RADIOLOGY | Facility: HOSPITAL | Age: 55
Discharge: HOME | End: 2025-03-20
Attending: NURSE PRACTITIONER
Payer: MEDICARE

## 2025-03-20 DIAGNOSIS — R13.19 ESOPHAGEAL DYSPHAGIA: ICD-10-CM

## 2025-03-20 PROCEDURE — 25500020 PHARM REV CODE 255: Performed by: NURSE PRACTITIONER

## 2025-03-20 PROCEDURE — 74220 X-RAY XM ESOPHAGUS 1CNTRST: CPT | Mod: TC

## 2025-03-20 PROCEDURE — A9698 NON-RAD CONTRAST MATERIALNOC: HCPCS | Performed by: NURSE PRACTITIONER

## 2025-03-20 RX ADMIN — Medication 176 G: at 10:03

## 2025-03-20 RX ADMIN — BARIUM SULFATE: 980 POWDER, FOR SUSPENSION ORAL at 10:03

## 2025-03-24 ENCOUNTER — RESULTS FOLLOW-UP (OUTPATIENT)
Dept: GASTROENTEROLOGY | Facility: CLINIC | Age: 55
End: 2025-03-24

## 2025-03-24 ENCOUNTER — PATIENT MESSAGE (OUTPATIENT)
Dept: GASTROENTEROLOGY | Facility: CLINIC | Age: 55
End: 2025-03-24
Payer: MEDICARE

## 2025-06-16 ENCOUNTER — OFFICE VISIT (OUTPATIENT)
Dept: GASTROENTEROLOGY | Facility: CLINIC | Age: 55
End: 2025-06-16
Payer: MEDICARE

## 2025-06-16 VITALS
HEIGHT: 60 IN | BODY MASS INDEX: 34.95 KG/M2 | WEIGHT: 178 LBS | HEART RATE: 85 BPM | OXYGEN SATURATION: 96 % | SYSTOLIC BLOOD PRESSURE: 142 MMHG | DIASTOLIC BLOOD PRESSURE: 77 MMHG

## 2025-06-16 DIAGNOSIS — K22.4 ESOPHAGEAL DYSMOTILITY: ICD-10-CM

## 2025-06-16 DIAGNOSIS — K75.81 NASH (NONALCOHOLIC STEATOHEPATITIS): ICD-10-CM

## 2025-06-16 DIAGNOSIS — R19.7 DIARRHEA, UNSPECIFIED TYPE: Primary | ICD-10-CM

## 2025-06-16 PROCEDURE — 3078F DIAST BP <80 MM HG: CPT | Mod: CPTII,,, | Performed by: NURSE PRACTITIONER

## 2025-06-16 PROCEDURE — 3008F BODY MASS INDEX DOCD: CPT | Mod: CPTII,,, | Performed by: NURSE PRACTITIONER

## 2025-06-16 PROCEDURE — 1159F MED LIST DOCD IN RCRD: CPT | Mod: CPTII,,, | Performed by: NURSE PRACTITIONER

## 2025-06-16 PROCEDURE — 99999 PR PBB SHADOW E&M-EST. PATIENT-LVL IV: CPT | Mod: PBBFAC,,, | Performed by: NURSE PRACTITIONER

## 2025-06-16 PROCEDURE — 1160F RVW MEDS BY RX/DR IN RCRD: CPT | Mod: CPTII,,, | Performed by: NURSE PRACTITIONER

## 2025-06-16 PROCEDURE — 3077F SYST BP >= 140 MM HG: CPT | Mod: CPTII,,, | Performed by: NURSE PRACTITIONER

## 2025-06-16 PROCEDURE — 99214 OFFICE O/P EST MOD 30 MIN: CPT | Mod: PBBFAC | Performed by: NURSE PRACTITIONER

## 2025-06-16 PROCEDURE — 99214 OFFICE O/P EST MOD 30 MIN: CPT | Mod: S$PBB,,, | Performed by: NURSE PRACTITIONER

## 2025-06-16 RX ORDER — LEVOTHYROXINE SODIUM 88 UG/1
88 TABLET ORAL
COMMUNITY
Start: 2025-05-21

## 2025-06-16 RX ORDER — TIZANIDINE 2 MG/1
2 TABLET ORAL EVERY 6 HOURS PRN
COMMUNITY

## 2025-06-16 NOTE — PROGRESS NOTES
Andressa Handley is a 55 y.o. female here for Follow-up        PCP: Kristian Michael II  Referring Provider: No referring provider defined for this encounter.     HPI:  Presents for follow-up after esophagram due to dysphagia.  Patient had esophagram on 03/2025, mild esophageal dysmotility, no esophageal stricture or narrowing was noted.  Patient did have EGD on 11/11/2024, 5 cm hiatal hernia.  H pylori negative.  We did discuss using room temperature liquid when eating.  States that she does not feel she needs a PPI.  The patient also had a gastric emptying study that is normal.  CT abdomen and pelvis performed on 10/17/2024, report reviewed, hepatic steatosis.  Nonobstructing renal stones.  Pancreas appeared normal.  Previous cholecystectomy.  The patient continues to report intermittent loose stool.  She did have stool studies performed that did show abnormal fatty acid.  In review of medications the patient was previously taking alpha lipoic acid which is a fatty acid which may have altered stool testing.  Pancreatic elastase was normal.  Due to increased fat in the stool and previous cholecystectomy the patient was prescribed Colestid.  The patient is unable to tolerate Colestid.  States that when she took 1-2 doses of Colestid that she felt constipated and that it made her feel bad.  Colonoscopy on 11/14/2024, report reviewed, history of sigmoid colectomy due to recurrent diverticulitis.  She did have an ulcerated pseudopolyp noted in the rectum.  Biopsy was benign.  Biopsies taken throughout the colon were negative for any evidence of microscopic colitis or inflammatory bowel disease.  No hematochezia or melena.  Since she is unable to tolerate Colestid we did discuss adding Citrucel or fiber gummies due to loose stools.  Celiac labs are negative.  We did discuss a diet low in saturated fat and carbohydrates due to fatty liver.  Also recommend weight loss of 7-10%.    Follow-up  Pertinent negatives include  "no abdominal pain, change in bowel habit, fatigue, fever, nausea or vomiting.         ROS:  Review of Systems   Constitutional:  Negative for appetite change, fatigue, fever and unexpected weight change.   HENT:  Positive for trouble swallowing.    Gastrointestinal:  Positive for blood in stool and diarrhea. Negative for abdominal pain, anal bleeding, change in bowel habit, constipation, nausea, vomiting and reflux.   Musculoskeletal:  Negative for gait problem.   Integumentary:  Negative for pallor.   Psychiatric/Behavioral:  The patient is not nervous/anxious.           PMHX:  has a past medical history of Anemia, Arthritis, Autoimmune disease, Cancer, Cerebral aneurysm, Chest pain, Headache, High cholesterol, Hypotension, iatrogenic, Kidney stones, Liver cyst, Muscle pain, and Neuropathy.    PSHX:  has a past surgical history that includes Tonsillectomy; Appendectomy; Cholecystectomy; Hysterectomy; Spine surgery; Cerebral aneurysm repair; Craniotomy; coiling of aneurysm; Cervical fusion; right shoulder repair; right knee repair; Thyroidectomy; internal hemorrhoid removal; oopherectomy; Bladder repair; and tvt repair.    PFHX: family history includes Dementia in her father; Lupus in her mother; Parkinsonism in her father.    PSlHX:  reports that she has quit smoking. She has never used smokeless tobacco. She reports that she does not drink alcohol and does not use drugs.        Review of patient's allergies indicates:   Allergen Reactions    Duloxetine Anaphylaxis and Other (See Comments)     Pt reports "night terrors",  "excessive sweating", fluid retention, and decreased urine output.      Modafinil Other (See Comments)     Suicidal thoughts      Dermabond [tissue glues] Rash    Fluocinolone acetonide Hives and Itching    Hydrocodone Other (See Comments)     Pt reports "locks my bowels".    Hydrocodone-acetaminophen     Morphine Nausea And Vomiting       Medication List with Changes/Refills   Current Medications "    ACETAMINOPHEN (TYLENOL EXTRA STRENGTH) 500 MG PWPK    1,000 mg 3 (three) times daily.    ALPHA LIPOIC ACID 200 MG CAP        COLESTIPOL (COLESTID) 1 GRAM TAB    Take 1 tablet (1 g total) by mouth 2 (two) times daily.    DEXTROAMPHETAMINE (DEXTROSTAT) 10 MG TABLET    Take 10 mg by mouth 2 (two) times daily.    FOLIC ACID (FOLVITE) 1 MG TABLET    Take 1,000 mcg by mouth once daily.    LEVOTHYROXINE (SYNTHROID) 88 MCG TABLET    Take 88 mcg by mouth before breakfast.    MELATONIN 10 MG CAP    Take by mouth.    METOPROLOL SUCCINATE (TOPROL-XL) 25 MG 24 HR TABLET    Take 25 mg by mouth once daily.    MULTIVITAMIN-IRON-FOLIC ACID TAB    Take 1 tablet by mouth once daily.    PANTOPRAZOLE (PROTONIX) 40 MG TABLET    Take 1 tablet (40 mg total) by mouth once daily.    SECUKINUMAB (COSENTYX, 2 SYRINGES, SUBQ)    Inject 300 mg into the skin every 28 days.    TIZANIDINE (ZANAFLEX) 2 MG TABLET    Take 2 mg by mouth every 6 (six) hours as needed.   Discontinued Medications    LEVOTHYROXINE (SYNTHROID) 75 MCG TABLET    Take 1 tablet by mouth.        Objective Findings:  Vital Signs:  BP (!) 142/77 (BP Location: Left arm, Patient Position: Sitting)   Pulse 85   Ht 5' (1.524 m)   Wt 80.7 kg (178 lb)   SpO2 96%   BMI 34.76 kg/m²  Body mass index is 34.76 kg/m².    Physical Exam:  Physical Exam  Vitals and nursing note reviewed.   Constitutional:       General: She is not in acute distress.     Appearance: Normal appearance.   HENT:      Mouth/Throat:      Mouth: Mucous membranes are moist.   Cardiovascular:      Rate and Rhythm: Normal rate.   Pulmonary:      Effort: Pulmonary effort is normal.   Abdominal:      General: Bowel sounds are normal. There is no distension.      Palpations: Abdomen is soft. There is no mass.      Tenderness: There is no abdominal tenderness.   Skin:     General: Skin is warm and dry.      Coloration: Skin is not jaundiced or pale.   Neurological:      Mental Status: She is alert and oriented to  person, place, and time.   Psychiatric:         Mood and Affect: Mood normal.          Labs:  Lab Results   Component Value Date    WBC 8.76 10/10/2024    HGB 12.7 10/10/2024    HCT 40.5 10/10/2024    MCV 86.9 10/10/2024    RDW 13.5 10/10/2024     10/10/2024    LYMPH 18.6 (L) 10/10/2024    LYMPH 1.63 10/10/2024    MONO 11.1 (H) 10/10/2024    EOS 0.19 10/10/2024    BASO 0.08 10/10/2024     Lab Results   Component Value Date     10/10/2024    K 4.2 10/10/2024     (H) 10/10/2024    CO2 29 10/10/2024    GLU 91 10/10/2024    BUN 15 10/10/2024    CREATININE 0.87 10/10/2024    CALCIUM 9.1 10/10/2024    PROT 7.2 10/10/2024    ALBUMIN 3.7 10/10/2024    BILITOT 0.4 10/10/2024    ALKPHOS 98 10/10/2024    AST 30 10/10/2024    ALT 44 10/10/2024         Imaging: MRI Lumbar Spine Without Contrast  Result Date: 6/12/2025  RADIOLOGIC EXAM: MR LUMBAR SPINE WO IV CONTRAST DATE AND TIME OF EXAMINATION: 6/12/2025 11:46 AM CLINICAL HISTORY: Low back pain.  M43.16 - Spondylolisthesis, lumbar region; lumbar spondylolisthesis   COMPARISON: Lumbar radiographs dated 11/27/2024. Outside lumbar CT dated 6/26/2024. Outside lumbar MRI dated 3/20/2024. TECHNIQUE: MR LUMBAR SPINE WO IV CONTRAST FINDINGS: There is a transitional lumbosacral vertebral body. There are 6 nonrib-bearing vertebrae above the transitional body, so I will label the transitional vertebra S1, consistent with the prior plain radiographs from 11/20/2024. Using this numbering scheme, there has been prior fusion of the S1 and S2 vertebrae, including screw fixation through the S1-2 disc space and posterior fixation with hardware. There is grade 1 spondylolisthesis of L5 on S1. Lumbar vertebrae are otherwise normal in height and alignment. No significant marrow abnormality. Spinal canal is adequate in sagittal dimension. Conus terminates at the upper L3 level, probably within the limits of normal given the presence of the transitional lumbosacral vertebra. Conus  is normal in size and signal intensity. Paravertebral soft tissues are unremarkable. T12-L1: Normal disc contour. Canal and foramina are patent. L1-L2: Normal disc contour. Canal and foramina are patent. L2-L3: Degenerative disc disease with disc space narrowing and mild diffuse disc bulge. Central canal, subarticular recesses, and foramina remain patent. L3-L4: Minimal bulging annulus with small left subarticular disc protrusion. There is slight ventral narrowing of the left subarticular recess. No significant compression of the contained L4 nerve root. Central canal, subarticular recesses, and foramina are otherwise patent. L4-L5: Minimal disc bulge. There is moderate degenerative facet disease at this level with trace anterolisthesis of L4 and L5. No central canal stenosis. Mild narrowing of the right subarticular recess without compression of the contained L5 nerve root. Left subarticular recess is patent. Foramina are bilaterally patent. L5-S1: Severe degenerative facet disease with about 7 mm anterolisthesis of L5 on the transitional S1 vertebra. Central canal and subarticular recesses are patent. Mild pseudodisc bulge most apparent in the left neural foramen., Causing mild to moderate left foraminal narrowing. The right foramen remains patent. S1-S2: Prior anterior and posterior fusion as noted above. Normal disc contour. Canal and foramina are patent.    IMPRESSION: Transitional lumbosacral vertebra described as S1 on the current study as detailed in findings. Postsurgical changes following prior anterior and posterior vertebral fusion at S1-S2, using the numbering scheme employed. Grade 1 spondylolisthesis at L5-S1 secondary to severe degenerative facet disease, with mild-moderate narrowing of the left neural foramen. Additional mild spondylotic changes as described. ATTENDING RADIOLOGIST: Lorenzo Lubin M.D.    DXA Bone Density Axial Hip Pelvis Spine  Result Date: 6/10/2025  HISTORY: osteopenia,  "Postmenopausal, Osteopenia of left hip, Osteopenia of right hip BONE DENSITOMETRY Comparison: 29 October 2020 Lumbar spine: BMD: 0.924 g/cc T-score: -1.1 Z-score: 0.1 Bone density: Osteopenia Bilateral hips: BMD: 0.755 g/cc T-score: -0.8 Z-score: 0.3 Bone density: Normal     Impression: Osteopenia lumbar spine, normal bilateral hips. TBS mapping: The lumbar spine TBS is 1.177 which suggests a degraded microarchitecture compared to reference population. The patient's associated BMD and TB as value suggest a low resilience to fracture. Furthermore, the minimum BMD T score position the patient in the osteopenia category equivalent.        Assessment:  Andressa Handley is a 55 y.o. female here with:  1. Diarrhea, unspecified type    2. WILSON (nonalcoholic steatohepatitis)    3. Esophageal dysmotility          Recommendations:  1. Recommend fiber gummies or Citrucel  2. Increase water intake 64 oz per day  3. Room temperature liquids when eating  4. Exercise 150 minutes per week  Weight loss of 7-10%. Weight loss should be gradual  Diet low in saturated fats and carbohydrates  Good glucose and cholesterol control  5. Schedule liver ultrasound and elastography      Portions of this note may have been created with voice recognition software.  Occasional wrong word or "sound a like substitutions may have occurred due to inherent limitations of voice recognition software.  Please read the note carefully and recognize using contexts, where substitutions have occurred.    Diagnosis, risks, benefits, and side effects of any medications and treatment plan were discussed with the patient.  All questions were answered to the satisfaction of the patient, and patient verbalized understanding and agreement to the treatment plan.        Follow up in about 4 months (around 10/16/2025).      Order summary:  Orders Placed This Encounter    US Abdomen Limited    US Elastography Liver w/imaging       Thank you for allowing me to " participate in the care of Andressa Handley.      DEONTE Pnea

## 2025-06-26 ENCOUNTER — HOSPITAL ENCOUNTER (OUTPATIENT)
Dept: RADIOLOGY | Facility: HOSPITAL | Age: 55
Discharge: HOME | End: 2025-06-26
Attending: NURSE PRACTITIONER
Payer: MEDICARE

## 2025-06-26 ENCOUNTER — RESULTS FOLLOW-UP (OUTPATIENT)
Dept: GASTROENTEROLOGY | Facility: CLINIC | Age: 55
End: 2025-06-26

## 2025-06-26 DIAGNOSIS — K75.81 NASH (NONALCOHOLIC STEATOHEPATITIS): ICD-10-CM

## 2025-06-26 PROCEDURE — 76981 USE PARENCHYMA: CPT | Mod: TC

## 2025-06-26 PROCEDURE — 76705 ECHO EXAM OF ABDOMEN: CPT | Mod: 26,,, | Performed by: RADIOLOGY

## 2025-06-26 PROCEDURE — 76705 ECHO EXAM OF ABDOMEN: CPT | Mod: TC
